# Patient Record
Sex: MALE | Race: WHITE | NOT HISPANIC OR LATINO | Employment: FULL TIME | URBAN - METROPOLITAN AREA
[De-identification: names, ages, dates, MRNs, and addresses within clinical notes are randomized per-mention and may not be internally consistent; named-entity substitution may affect disease eponyms.]

---

## 2020-02-06 ENCOUNTER — OFFICE VISIT - HEALTHEAST (OUTPATIENT)
Dept: INTERNAL MEDICINE | Facility: CLINIC | Age: 55
End: 2020-02-06

## 2020-02-06 DIAGNOSIS — Z12.5 SCREENING FOR PROSTATE CANCER: ICD-10-CM

## 2020-02-06 DIAGNOSIS — E78.5 HYPERLIPIDEMIA, UNSPECIFIED HYPERLIPIDEMIA TYPE: ICD-10-CM

## 2020-02-06 DIAGNOSIS — E11.9 CONTROLLED TYPE 2 DIABETES MELLITUS WITHOUT COMPLICATION, WITHOUT LONG-TERM CURRENT USE OF INSULIN (H): ICD-10-CM

## 2020-02-06 DIAGNOSIS — I10 ESSENTIAL HYPERTENSION: ICD-10-CM

## 2020-02-06 DIAGNOSIS — Z98.84 STATUS POST BARIATRIC SURGERY: ICD-10-CM

## 2020-02-06 ASSESSMENT — MIFFLIN-ST. JEOR: SCORE: 1850.5

## 2020-02-22 ENCOUNTER — OFFICE VISIT - HEALTHEAST (OUTPATIENT)
Dept: FAMILY MEDICINE | Facility: CLINIC | Age: 55
End: 2020-02-22

## 2020-02-22 DIAGNOSIS — S99.921A INJURY OF TOENAIL OF RIGHT FOOT, INITIAL ENCOUNTER: ICD-10-CM

## 2020-02-22 ASSESSMENT — MIFFLIN-ST. JEOR: SCORE: 2066.19

## 2020-03-25 ENCOUNTER — COMMUNICATION - HEALTHEAST (OUTPATIENT)
Dept: LAB | Facility: CLINIC | Age: 55
End: 2020-03-25

## 2020-08-07 ENCOUNTER — AMBULATORY - HEALTHEAST (OUTPATIENT)
Dept: LAB | Facility: CLINIC | Age: 55
End: 2020-08-07

## 2020-08-07 DIAGNOSIS — E11.9 CONTROLLED TYPE 2 DIABETES MELLITUS WITHOUT COMPLICATION, WITHOUT LONG-TERM CURRENT USE OF INSULIN (H): ICD-10-CM

## 2020-08-07 DIAGNOSIS — Z12.5 SCREENING FOR PROSTATE CANCER: ICD-10-CM

## 2020-08-07 DIAGNOSIS — I10 ESSENTIAL HYPERTENSION: ICD-10-CM

## 2020-08-07 DIAGNOSIS — E78.5 HYPERLIPIDEMIA, UNSPECIFIED HYPERLIPIDEMIA TYPE: ICD-10-CM

## 2020-08-07 DIAGNOSIS — Z98.84 STATUS POST BARIATRIC SURGERY: ICD-10-CM

## 2020-08-07 LAB
ALBUMIN SERPL-MCNC: 4.1 G/DL (ref 3.5–5)
ALP SERPL-CCNC: 59 U/L (ref 45–120)
ALT SERPL W P-5'-P-CCNC: 14 U/L (ref 0–45)
ANION GAP SERPL CALCULATED.3IONS-SCNC: 9 MMOL/L (ref 5–18)
AST SERPL W P-5'-P-CCNC: 17 U/L (ref 0–40)
BILIRUB SERPL-MCNC: 0.3 MG/DL (ref 0–1)
BUN SERPL-MCNC: 11 MG/DL (ref 8–22)
CALCIUM SERPL-MCNC: 9.6 MG/DL (ref 8.5–10.5)
CHLORIDE BLD-SCNC: 99 MMOL/L (ref 98–107)
CHOLEST SERPL-MCNC: 145 MG/DL
CO2 SERPL-SCNC: 29 MMOL/L (ref 22–31)
CREAT SERPL-MCNC: 1.14 MG/DL (ref 0.7–1.3)
CREAT UR-MCNC: 81.5 MG/DL
ERYTHROCYTE [DISTWIDTH] IN BLOOD BY AUTOMATED COUNT: 11.5 % (ref 11–14.5)
FASTING STATUS PATIENT QL REPORTED: YES
FERRITIN SERPL-MCNC: 25 NG/ML (ref 27–300)
GFR SERPL CREATININE-BSD FRML MDRD: >60 ML/MIN/1.73M2
GLUCOSE BLD-MCNC: 88 MG/DL (ref 70–125)
HBA1C MFR BLD: 6.9 %
HCT VFR BLD AUTO: 44.1 % (ref 40–54)
HDLC SERPL-MCNC: 37 MG/DL
HGB BLD-MCNC: 14.7 G/DL (ref 14–18)
LDLC SERPL CALC-MCNC: 79 MG/DL
MCH RBC QN AUTO: 30.8 PG (ref 27–34)
MCHC RBC AUTO-ENTMCNC: 33.4 G/DL (ref 32–36)
MCV RBC AUTO: 92 FL (ref 80–100)
MICROALBUMIN UR-MCNC: <0.5 MG/DL (ref 0–1.99)
MICROALBUMIN/CREAT UR: NORMAL MG/G{CREAT}
PLATELET # BLD AUTO: 175 THOU/UL (ref 140–440)
PMV BLD AUTO: 8.6 FL (ref 7–10)
POTASSIUM BLD-SCNC: 4.4 MMOL/L (ref 3.5–5)
PROT SERPL-MCNC: 7.1 G/DL (ref 6–8)
PSA SERPL-MCNC: 0.9 NG/ML (ref 0–3.5)
RBC # BLD AUTO: 4.78 MILL/UL (ref 4.4–6.2)
SODIUM SERPL-SCNC: 137 MMOL/L (ref 136–145)
TRIGL SERPL-MCNC: 143 MG/DL
TSH SERPL DL<=0.005 MIU/L-ACNC: 1.03 UIU/ML (ref 0.3–5)
VIT B12 SERPL-MCNC: 367 PG/ML (ref 213–816)
WBC: 7.8 THOU/UL (ref 4–11)

## 2020-08-10 LAB
25(OH)D3 SERPL-MCNC: 16.2 NG/ML (ref 30–80)
25(OH)D3 SERPL-MCNC: 16.2 NG/ML (ref 30–80)

## 2020-09-08 ENCOUNTER — OFFICE VISIT - HEALTHEAST (OUTPATIENT)
Dept: INTERNAL MEDICINE | Facility: CLINIC | Age: 55
End: 2020-09-08

## 2020-09-08 DIAGNOSIS — E78.5 HYPERLIPIDEMIA, UNSPECIFIED HYPERLIPIDEMIA TYPE: ICD-10-CM

## 2020-09-08 DIAGNOSIS — Z00.00 HEALTHCARE MAINTENANCE: ICD-10-CM

## 2020-09-08 DIAGNOSIS — E11.9 CONTROLLED TYPE 2 DIABETES MELLITUS WITHOUT COMPLICATION, WITHOUT LONG-TERM CURRENT USE OF INSULIN (H): ICD-10-CM

## 2020-09-08 DIAGNOSIS — E61.1 IRON DEFICIENCY: ICD-10-CM

## 2020-09-08 DIAGNOSIS — E55.9 VITAMIN D DEFICIENCY: ICD-10-CM

## 2020-09-08 DIAGNOSIS — Z12.11 SCREEN FOR COLON CANCER: ICD-10-CM

## 2020-09-08 DIAGNOSIS — I10 ESSENTIAL HYPERTENSION: ICD-10-CM

## 2020-09-08 ASSESSMENT — MIFFLIN-ST. JEOR: SCORE: 2143.07

## 2021-01-16 ENCOUNTER — RECORDS - HEALTHEAST (OUTPATIENT)
Dept: ADMINISTRATIVE | Facility: OTHER | Age: 56
End: 2021-01-16

## 2021-01-16 LAB — RETINOPATHY: NEGATIVE

## 2021-02-05 ENCOUNTER — RECORDS - HEALTHEAST (OUTPATIENT)
Dept: ADMINISTRATIVE | Facility: OTHER | Age: 56
End: 2021-02-05

## 2021-02-11 ENCOUNTER — RECORDS - HEALTHEAST (OUTPATIENT)
Dept: HEALTH INFORMATION MANAGEMENT | Facility: CLINIC | Age: 56
End: 2021-02-11

## 2021-02-18 ENCOUNTER — AMBULATORY - HEALTHEAST (OUTPATIENT)
Dept: LAB | Facility: CLINIC | Age: 56
End: 2021-02-18

## 2021-02-18 DIAGNOSIS — I10 ESSENTIAL HYPERTENSION: ICD-10-CM

## 2021-02-18 DIAGNOSIS — E55.9 VITAMIN D DEFICIENCY: ICD-10-CM

## 2021-02-18 DIAGNOSIS — E61.1 IRON DEFICIENCY: ICD-10-CM

## 2021-02-18 DIAGNOSIS — E11.9 CONTROLLED TYPE 2 DIABETES MELLITUS WITHOUT COMPLICATION, WITHOUT LONG-TERM CURRENT USE OF INSULIN (H): ICD-10-CM

## 2021-02-18 LAB
ALBUMIN SERPL-MCNC: 3.9 G/DL (ref 3.5–5)
ALP SERPL-CCNC: 56 U/L (ref 45–120)
ALT SERPL W P-5'-P-CCNC: 26 U/L (ref 0–45)
ANION GAP SERPL CALCULATED.3IONS-SCNC: 8 MMOL/L (ref 5–18)
AST SERPL W P-5'-P-CCNC: 25 U/L (ref 0–40)
BILIRUB SERPL-MCNC: 0.4 MG/DL (ref 0–1)
BUN SERPL-MCNC: 11 MG/DL (ref 8–22)
CALCIUM SERPL-MCNC: 9 MG/DL (ref 8.5–10.5)
CHLORIDE BLD-SCNC: 100 MMOL/L (ref 98–107)
CO2 SERPL-SCNC: 27 MMOL/L (ref 22–31)
CREAT SERPL-MCNC: 1.21 MG/DL (ref 0.7–1.3)
ERYTHROCYTE [DISTWIDTH] IN BLOOD BY AUTOMATED COUNT: 11.8 % (ref 11–14.5)
FERRITIN SERPL-MCNC: 60 NG/ML (ref 27–300)
GFR SERPL CREATININE-BSD FRML MDRD: >60 ML/MIN/1.73M2
GLUCOSE BLD-MCNC: 162 MG/DL (ref 70–125)
HBA1C MFR BLD: 7.5 %
HCT VFR BLD AUTO: 43.2 % (ref 40–54)
HGB BLD-MCNC: 14.4 G/DL (ref 14–18)
MCH RBC QN AUTO: 30.4 PG (ref 27–34)
MCHC RBC AUTO-ENTMCNC: 33.3 G/DL (ref 32–36)
MCV RBC AUTO: 91 FL (ref 80–100)
PLATELET # BLD AUTO: 254 THOU/UL (ref 140–440)
PMV BLD AUTO: 9.5 FL (ref 7–10)
POTASSIUM BLD-SCNC: 4.2 MMOL/L (ref 3.5–5)
PROT SERPL-MCNC: 7 G/DL (ref 6–8)
RBC # BLD AUTO: 4.73 MILL/UL (ref 4.4–6.2)
SODIUM SERPL-SCNC: 135 MMOL/L (ref 136–145)
WBC: 7.1 THOU/UL (ref 4–11)

## 2021-02-19 ENCOUNTER — OFFICE VISIT - HEALTHEAST (OUTPATIENT)
Dept: INTERNAL MEDICINE | Facility: CLINIC | Age: 56
End: 2021-02-19

## 2021-02-19 DIAGNOSIS — E55.9 VITAMIN D DEFICIENCY: ICD-10-CM

## 2021-02-19 DIAGNOSIS — E11.9 CONTROLLED TYPE 2 DIABETES MELLITUS WITHOUT COMPLICATION, WITHOUT LONG-TERM CURRENT USE OF INSULIN (H): ICD-10-CM

## 2021-02-19 DIAGNOSIS — F41.9 ANXIETY: ICD-10-CM

## 2021-02-19 DIAGNOSIS — Z12.11 SCREEN FOR COLON CANCER: ICD-10-CM

## 2021-02-19 DIAGNOSIS — G47.33 OBSTRUCTIVE SLEEP APNEA: ICD-10-CM

## 2021-02-19 LAB — 25(OH)D3 SERPL-MCNC: 25.1 NG/ML (ref 30–80)

## 2021-02-19 ASSESSMENT — MIFFLIN-ST. JEOR: SCORE: 2165.75

## 2021-03-07 ENCOUNTER — AMBULATORY - HEALTHEAST (OUTPATIENT)
Dept: MULTI SPECIALTY CLINIC | Facility: CLINIC | Age: 56
End: 2021-03-07

## 2021-03-12 ENCOUNTER — OFFICE VISIT - HEALTHEAST (OUTPATIENT)
Dept: INTERNAL MEDICINE | Facility: CLINIC | Age: 56
End: 2021-03-12

## 2021-03-12 DIAGNOSIS — F43.24 ADJUSTMENT DISORDER WITH DISTURBANCE OF CONDUCT: ICD-10-CM

## 2021-03-12 DIAGNOSIS — E11.9 CONTROLLED TYPE 2 DIABETES MELLITUS WITHOUT COMPLICATION, WITHOUT LONG-TERM CURRENT USE OF INSULIN (H): ICD-10-CM

## 2021-03-12 ASSESSMENT — ANXIETY QUESTIONNAIRES
GAD7 TOTAL SCORE: 0
7. FEELING AFRAID AS IF SOMETHING AWFUL MIGHT HAPPEN: NOT AT ALL
6. BECOMING EASILY ANNOYED OR IRRITABLE: NOT AT ALL
1. FEELING NERVOUS, ANXIOUS, OR ON EDGE: NOT AT ALL
5. BEING SO RESTLESS THAT IT IS HARD TO SIT STILL: NOT AT ALL
2. NOT BEING ABLE TO STOP OR CONTROL WORRYING: NOT AT ALL
IF YOU CHECKED OFF ANY PROBLEMS ON THIS QUESTIONNAIRE, HOW DIFFICULT HAVE THESE PROBLEMS MADE IT FOR YOU TO DO YOUR WORK, TAKE CARE OF THINGS AT HOME, OR GET ALONG WITH OTHER PEOPLE: NOT DIFFICULT AT ALL
3. WORRYING TOO MUCH ABOUT DIFFERENT THINGS: NOT AT ALL
4. TROUBLE RELAXING: NOT AT ALL

## 2021-03-15 ENCOUNTER — COMMUNICATION - HEALTHEAST (OUTPATIENT)
Dept: ADMINISTRATIVE | Facility: CLINIC | Age: 56
End: 2021-03-15

## 2021-03-26 ENCOUNTER — AMBULATORY - HEALTHEAST (OUTPATIENT)
Dept: NURSING | Facility: CLINIC | Age: 56
End: 2021-03-26

## 2021-04-01 ENCOUNTER — COMMUNICATION - HEALTHEAST (OUTPATIENT)
Dept: FAMILY MEDICINE | Facility: CLINIC | Age: 56
End: 2021-04-01

## 2021-04-01 DIAGNOSIS — I10 ESSENTIAL HYPERTENSION: ICD-10-CM

## 2021-04-01 DIAGNOSIS — E78.5 HYPERLIPIDEMIA, UNSPECIFIED HYPERLIPIDEMIA TYPE: ICD-10-CM

## 2021-04-15 VITALS — HEIGHT: 70 IN | BODY MASS INDEX: 40.09 KG/M2 | WEIGHT: 280 LBS

## 2021-04-15 RX ORDER — ESCITALOPRAM OXALATE 10 MG/1
10 TABLET ORAL
COMMUNITY
Start: 2021-02-19 | End: 2022-03-10

## 2021-04-15 RX ORDER — LISINOPRIL 20 MG/1
20 TABLET ORAL
COMMUNITY
Start: 2019-10-14 | End: 2022-05-15

## 2021-04-15 RX ORDER — SIMVASTATIN 40 MG
40 TABLET ORAL
COMMUNITY
Start: 2019-10-14 | End: 2022-05-15

## 2021-04-15 ASSESSMENT — MIFFLIN-ST. JEOR: SCORE: 2111.32

## 2021-04-15 NOTE — PATIENT INSTRUCTIONS
For general sleep health questions:   http://sleepeducation.org    For tips about PAP and COVID-19:  https://www.thoracic.org/patients/patient-resources/resources/covid-19-and-home-pap-therapy.pdf    For general info about COVID-19 including vaccines:  https://[employer].org/covid19        Continue PAP therapy every night, for all hours that you are sleeping (including naps.)  As always, try to get at least 8 hours of sleep or more each day, keep a regular sleep schedule, and avoid sleep deprivation. Avoid alcohol.    Reasons that you might need a change to your pressure therapy would be weight gain or loss, waking having inadvertently removed your PAP overnight, having previously felt refreshed by sleep with CPAP use and now waking un-refreshed, and return of daytime sleepiness. Also, the development of new medical problems  (such as heart failure, stroke, medications such as narcotics) can sometimes affect breathing at night and change your PAP therapy needs.    Please bring PAP with you if you are hospitalized.  If anticipating surgery be sure to discuss with your surgeon that you have sleep apnea and use PAP therapy.      Maintain your equipment as recommended which includes routine cleaning and replacement of supplies.      Call DME for any questions regarding supplies or maintenance.    You DME supplier is [facility]      Do not drive on engage in potentially dangerous activities if feeling sleepy.    Please follow up in sleep clinic again in 2 months.        Tips for your PAP use-    Mask fitting tips  Mask fitting exercise:    To improve your mask seal and your mobility at night, put mask on and secure in place.  Lie down in bed with full pressure and roll to one side, adjust headgear while in that position to eliminate any leaks. Repeat process rolling to other side.     The mask seal does not have to be perfect:   CPAP machines are designed to make up for small leaks. However, you will not  tolerate leaks blowing in your eyes so you will need to adjust.   Any leak should only be near or at the bottom of the mask.  We expect your mask to leak slightly at night.    Do not over-tighten the headgear straps, tighter IS NOT better, we expect minimal leak.    First try re-positioning the mask or headgear before tightening the headgear straps.  Mask leaks are expected due to changing sleeping positions. Try pulling the mask away from your skin allowing the cushion to re-inflate will minimize the leak.  If you struggle for a good fit, try turning the CPAP off and then readjust the mask by pulling it away from your face and then turning back on the CPAP.        Humidifier tips  Humidifiers can be adjusted to increase or decrease the amount of moisture according to your comfort level. You may need to adjust this frequently at first, but then might only change it with seasonal weather changes.     Try INCREASING the humidity if:  You experience a dry, irritated nasal passage or throat.  You have a runny, drippy nose or sneezing fits after using CPAP.  You experience nasal congestion during or after CPAP use.    Try DECREASING the humidity if:  You have excessive condensation or  rain out  in the tubing or mask.  Otherwise keep the tubing warm during the night by running it underneath the blankets or pillow.      Clinic visit after initial PAP set-up   Bring your equipment with you to your 5-8 week follow up clinic visit.  We will be extracting your data from the machine if not available from the cloud based modem.        Travel  Always take your equipment with you when you travel.  If you fly with your equipment bring it on with you as a carry on.  Medical equipment does not count as a carry on.    If you travel international the machines take 110-240v.  The only adapter needed is the adapter that will fit into the receptacle (outlet).    You may also want to bring an extension cord as many hotel rooms have  limited outlets at the bedside.  Do not travel with water in your humidifier chamber.     Cleaning and Maintenance Guidelines    Equipment Frequency Cleaning Method   Mask First Day    Daily      Weekly Soak mask in hot soapy water for 30 minutes, rinse and air dry.  Wipe nasal cushion with a hot soapy (Ivory, baby shampoo) cloth and rinse.  Baby wipes may also be used.  Do not use anti-bacterial soaps,Johnna  liquid soap, rubbing alcohol, bleach or ammonia.  Wash frame in hot soapy water (Ivory, baby shampoo) rinse and let air dry   Headgear Biweekly Wash in hot soapy water, rinse and air dry   Reusable Gray Filter Weekly Wash in hot soapy water, rinse, put in towel squeeze moisture out, let air dry   Disposable White Filter Check Weekly Replace when brown or gray in color; at least every 2 to 3 months   Humidifier Chamber Daily    Weekly Empty distilled water from humidifier and let air dry    Hand wash in hot soapy water, rinse and air dry   Tubing Weekly Wash in hot soapy water, rinse and let air dry   Mask, Tubing and Humidifier Chamber As needed Disinfect: Soak in 1 part distilled white vinegar to 3 parts hot water for 30 minutes, rinse well and air dry  Not the material headgear        MASK AND SUPPLY REORDERING and EQUIPMENT NEEDS through your DME and per your insurance  Reminder: Most insurance companies will allow for a new mask, headgear, tubing, and reusable gray filter every six months.  Disposable white ultra-fine filters are covered monthly.      HOME AND SAFETY INSTRUCTIONS    Do not use frayed or cracked electrical cords, multi plug adaptors, or switched receptacles    Do not immerse electrical equipment into water    Assure that electrical cords do not become a tripping hazard    Your BMI is Body mass index is 40.18 kg/m .  Weight management is a personal decision.  If you are interested in exploring weight loss strategies, the following discussion covers the approaches that may be successful. Body  mass index (BMI) is one way to tell whether you are at a healthy weight, overweight, or obese. It measures your weight in relation to your height.  A BMI of 18.5 to 24.9 is in the healthy range. A person with a BMI of 25 to 29.9 is considered overweight, and someone with a BMI of 30 or greater is considered obese. More than two-thirds of American adults are considered overweight or obese.  Being overweight or obese increases the risk for further weight gain. Excess weight may lead to heart disease and diabetes.  Creating and following plans for healthy eating and physical activity may help you improve your health.  Weight control is part of healthy lifestyle and includes exercise, emotional health, and healthy eating habits. Careful eating habits lifelong are the mainstay of weight control. Though there are significant health benefits from weight loss, long-term weight loss with diet alone may be very difficult to achieve- studies show long-term success with dietary management in less than 10% of people. Attaining a healthy weight may be especially difficult to achieve in those with severe obesity. In some cases, medications, devices and surgical management might be considered.  What can you do?  If you are overweight or obese and are interested in methods for weight loss, you should discuss this with your provider.     Consider reducing daily calorie intake by 500 calories.     Keep a food journal.     Avoiding skipping meals, consider cutting portions instead.    Diet combined with exercise helps maintain muscle while optimizing fat loss. Strength training is particularly important for building and maintaining muscle mass. Exercise helps reduce stress, increase energy, and improves fitness. Increasing exercise without diet control, however, may not burn enough calories to loose weight.       Start walking three days a week 10-20 minutes at a time    Work towards walking thirty minutes five days a week      Eventually, increase the speed of your walking for 1-2 minutes at time    In addition, we recommend that you review healthy lifestyles and methods for weight loss available through the National Institutes of Health patient information sites:  http://win.niddk.nih.gov/publications/index.htm    And look into health and wellness programs that may be available through your health insurance provider, employer, local community center, or xochilt club.    Weight management plan: Patient was referred to their PCP to discuss a diet and exercise plan.

## 2021-04-15 NOTE — PROGRESS NOTES
Enrike is a 55 year old who is being evaluated via a billable video visit.      How would you like to obtain your AVS? Mail a copy  If the video visit is dropped, the invitation should be resent by: Text to cell phone: [phone]  Will anyone else be joining your video visit? No    Fifi Garcia CMA      Video-Visit Details   Changed to telephone:   Type of service:  Video Visit    Tel Start time: 2:11 PM  Tel End Time:2:44 PM          Chief complaint: Having trouble keeping CPAP on all night    History of Present Illness: 55-year-old gentleman with history of diabetes, hypertension, obesity, obstructive sleep apnea.  He works as a  and has an irregular schedule.  He admits to difficulty with keeping CPAP on all night.  He is tends to wake up at night every couple of hours.  He is not sure what is waking him up.  He is worried that it is low oxygen levels.  It is hard for him to get 4 hours of CPAP use in one night.  He has not identified any specific issues such as nasal congestion or nocturnal reflux.  He denies waking up with a sense of gasping choking or inadequate pressure.  His sleep is not witnessed.  He usually sleeps on his back.  He uses a nasal pillows style mask.  He states is up-to-date and is recently been changed.  He denies any problems with his machine.  He uses the humidifier.  He has gained some weight since his last sleep study.  He does wake up to go to the bathroom at night.  Denies restless legs.  No known sleepwalking sleep talking or dream enactment behavior.  During the day he denies excessive sleepiness.  Sometimes he can get a little sleepy if he is sitting and not doing anything active.  He denies any symptoms of sleepiness behind the wheel.  Usually drinks 0-1 caffeinated beverage a day.  He does not drink alcohol.    [place] Sleepiness Scale   Sitting and reading: Would never doze   Watching TV: Moderate chance of dozing   Sitting, inactive in a public place (e.g. a  theatre or a meeting): Moderate chance of dozing   As a passenger in a car for an hour without a break: Moderate chance of dozing   Lying down to rest in the afternoon when circumstances permit: Would never doze   Sitting and talking to someone: Would never doze   Sitting quietly after a lunch without alcohol: Would never doze   In a car, while stopped for a few minutes in traffic: Would never doze   Total score - [employer]: 6   (Less than 10 normal)    Insomnia Severity Scale  ERENDIRA Total Score: 14  Total score categories:  0-7 = No clinically significant insomnia   8-14 = Subthreshold insomnia   15-21 = Clinical insomnia (moderate severity)  22-28 = Clinical insomnia (severe)        Past Medical History:   Diagnosis Date     Benign essential hypertension      Class 3 severe obesity due to excess calories with serious comorbidity and body mass index (BMI) of 40.0 to 44.9 in adult (H)      Diabetes mellitus (H)      JAH (obstructive sleep apnea)        No Known Allergies    Current Outpatient Medications   Medication     cholecalciferol 50 MCG (2000 UT) tablet     escitalopram (LEXAPRO) 10 MG tablet     lisinopril (ZESTRIL) 20 MG tablet     metFORMIN (GLUCOPHAGE) 1000 MG tablet     simvastatin (ZOCOR) 40 MG tablet     aspirin (ASA) 81 MG EC tablet     No current facility-administered medications for this visit.        Social History     Socioeconomic History     Marital status:      Spouse name: Not on file     Number of children: Not on file     Years of education: Not on file     Highest education level: Not on file   Occupational History     Not on file   Social Needs     Financial resource strain: Not on file     Food insecurity     Worry: Not on file     Inability: Not on file     Transportation needs     Medical: Not on file     Non-medical: Not on file   Tobacco Use     Smoking status: Never Smoker     Smokeless tobacco: Never Used   Substance and Sexual Activity     Alcohol use: Not on file     Drug use:  "Not on file     Sexual activity: Not on file   Lifestyle     Physical activity     Days per week: Not on file     Minutes per session: Not on file     Stress: Not on file   Relationships     Social connections     Talks on phone: Not on file     Gets together: Not on file     Attends Bahai service: Not on file     Active member of club or organization: Not on file     Attends meetings of clubs or organizations: Not on file     Relationship status: Not on file     Intimate partner violence     Fear of current or ex partner: Not on file     Emotionally abused: Not on file     Physically abused: Not on file     Forced sexual activity: Not on file   Other Topics Concern     Parent/sibling w/ CABG, MI or angioplasty before 65F 55M? Not Asked   Social History Narrative     Not on file       No family history on file.    Review of Systems: Positve per HPI, otherwise comprehensive review of systems is negative.    EXAM:  Ht 1.778 m (5' 10\")   Wt 127 kg (280 lb)   BMI 40.18 kg/m    GENERAL: Alert and no distress  RESP: No audible wheeze, cough; able to speak in complete sentences  PSYCH: Mentation appears normal, affect normalt, judgement and insight intact, normal speech..       PSG split-night [facility] 6/27/2017  Weight 265 lbs, BMI 38  AHI 41, RDI 47, Lowest sat O2 76%  CPAP titrated to 8-9    [facility]  PAP download from 2/15/2021 to 4/15/2021 reviewed:  Per cent of days used greater than 4 Hours 41% (minimum goal greater than 70%)  Average use on days used: 4 hours 41 min  Settings:CPAP 9 cmH2O  Average AHI 6.0 events per hour (goal less than 5)  Leak a 55-year-old gentleman with history of obesity, cceptable    ASSESSMENT:  Severe sleep apnea, hypertension and diabetes.  He is having unintentional mask removal affecting compliance.  AHI is suboptimally treated.  It is possible that pressures are inadequate and causing on intentional mask removal. Likely also behavioral factors including " irregular sleep work schedule that are likely impacting compliance.    PLAN:  Orders generated to put his device into auto titrating mode with min pressure of 9 max of 15.  Patient is instructed on the importance of using CPAP all night every night regardless of his schedule.  He should follow-up with me in 6 to 8 weeks with repeat download to reassess for benefit.  He is agreeable with this plan.  He is encouraged to continue efforts at weight management and follow-up with primary care regarding options.    58 minutes spent on the date of the encounter doing chart review, history and exam, documentation and further activities per the note    Mervat Martin M.D.  Pulmonary/Critical Care/Sleep Medicine    [facility]   Floor 1, [address]   [address] [place], [place] [address]. [place] [address]   Appointments: [phone]    The above note was dictated using voice recognition software and may include typographical errors. Please contact the author for any clarifications.

## 2021-04-16 ENCOUNTER — VIRTUAL VISIT (OUTPATIENT)
Dept: SLEEP MEDICINE | Facility: CLINIC | Age: 56
End: 2021-04-16
Payer: COMMERCIAL

## 2021-04-16 ENCOUNTER — AMBULATORY - HEALTHEAST (OUTPATIENT)
Dept: NURSING | Facility: CLINIC | Age: 56
End: 2021-04-16

## 2021-04-16 DIAGNOSIS — E66.01 MORBID OBESITY (H): ICD-10-CM

## 2021-04-16 DIAGNOSIS — G47.33 OSA (OBSTRUCTIVE SLEEP APNEA): Primary | ICD-10-CM

## 2021-04-16 PROCEDURE — 99204 OFFICE O/P NEW MOD 45 MIN: CPT | Mod: 95 | Performed by: INTERNAL MEDICINE

## 2021-04-16 SDOH — HEALTH STABILITY: MENTAL HEALTH: HOW OFTEN DO YOU HAVE A DRINK CONTAINING ALCOHOL?: NOT ASKED

## 2021-04-16 SDOH — HEALTH STABILITY: MENTAL HEALTH: HOW OFTEN DO YOU HAVE 6 OR MORE DRINKS ON ONE OCCASION?: NOT ASKED

## 2021-04-16 SDOH — HEALTH STABILITY: MENTAL HEALTH: HOW MANY STANDARD DRINKS CONTAINING ALCOHOL DO YOU HAVE ON A TYPICAL DAY?: NOT ASKED

## 2021-04-19 ENCOUNTER — COMMUNICATION - HEALTHEAST (OUTPATIENT)
Dept: INTERNAL MEDICINE | Facility: CLINIC | Age: 56
End: 2021-04-19

## 2021-04-19 NOTE — NURSING NOTE
Orders sent to [facility] for pressure change, JOEL mailed to get records from [facility], AVS mailed and patient scheduled for follow up.    Claudia Gresham [facility] ~[place]

## 2021-05-02 ENCOUNTER — HEALTH MAINTENANCE LETTER (OUTPATIENT)
Age: 56
End: 2021-05-02

## 2021-05-15 ENCOUNTER — AMBULATORY - HEALTHEAST (OUTPATIENT)
Dept: LAB | Facility: HOSPITAL | Age: 56
End: 2021-05-15

## 2021-05-15 DIAGNOSIS — E11.9 CONTROLLED TYPE 2 DIABETES MELLITUS WITHOUT COMPLICATION, WITHOUT LONG-TERM CURRENT USE OF INSULIN (H): ICD-10-CM

## 2021-05-15 LAB
ANION GAP SERPL CALCULATED.3IONS-SCNC: 5 MMOL/L (ref 5–18)
BUN SERPL-MCNC: 15 MG/DL (ref 8–22)
CALCIUM SERPL-MCNC: 9.4 MG/DL (ref 8.5–10.5)
CHLORIDE BLD-SCNC: 100 MMOL/L (ref 98–107)
CO2 SERPL-SCNC: 29 MMOL/L (ref 22–31)
CREAT SERPL-MCNC: 1.21 MG/DL (ref 0.7–1.3)
GFR SERPL CREATININE-BSD FRML MDRD: >60 ML/MIN/1.73M2
GLUCOSE BLD-MCNC: 221 MG/DL (ref 70–125)
HBA1C MFR BLD: 7.5 %
POTASSIUM BLD-SCNC: 4.4 MMOL/L (ref 3.5–5)
SODIUM SERPL-SCNC: 134 MMOL/L (ref 136–145)

## 2021-05-17 LAB — 25(OH)D3 SERPL-MCNC: 21.4 NG/ML (ref 30–80)

## 2021-05-21 ENCOUNTER — OFFICE VISIT - HEALTHEAST (OUTPATIENT)
Dept: FAMILY MEDICINE | Facility: CLINIC | Age: 56
End: 2021-05-21

## 2021-05-21 DIAGNOSIS — E11.9 CONTROLLED TYPE 2 DIABETES MELLITUS WITHOUT COMPLICATION, WITHOUT LONG-TERM CURRENT USE OF INSULIN (H): ICD-10-CM

## 2021-05-21 ASSESSMENT — MIFFLIN-ST. JEOR: SCORE: 2148.4

## 2021-05-26 ENCOUNTER — RECORDS - HEALTHEAST (OUTPATIENT)
Dept: ADMINISTRATIVE | Facility: CLINIC | Age: 56
End: 2021-05-26

## 2021-05-28 ASSESSMENT — ANXIETY QUESTIONNAIRES: GAD7 TOTAL SCORE: 0

## 2021-05-29 ENCOUNTER — RECORDS - HEALTHEAST (OUTPATIENT)
Dept: ADMINISTRATIVE | Facility: CLINIC | Age: 56
End: 2021-05-29

## 2021-06-04 VITALS
BODY MASS INDEX: 32.1 KG/M2 | HEART RATE: 62 BPM | DIASTOLIC BLOOD PRESSURE: 80 MMHG | WEIGHT: 224.25 LBS | OXYGEN SATURATION: 96 % | HEIGHT: 70 IN | SYSTOLIC BLOOD PRESSURE: 148 MMHG

## 2021-06-04 VITALS
WEIGHT: 271.8 LBS | BODY MASS INDEX: 38.91 KG/M2 | RESPIRATION RATE: 18 BRPM | SYSTOLIC BLOOD PRESSURE: 130 MMHG | HEART RATE: 74 BPM | DIASTOLIC BLOOD PRESSURE: 75 MMHG | HEIGHT: 70 IN | OXYGEN SATURATION: 98 %

## 2021-06-04 VITALS
HEART RATE: 70 BPM | BODY MASS INDEX: 41.09 KG/M2 | HEIGHT: 70 IN | WEIGHT: 287 LBS | DIASTOLIC BLOOD PRESSURE: 62 MMHG | SYSTOLIC BLOOD PRESSURE: 102 MMHG | OXYGEN SATURATION: 97 %

## 2021-06-05 VITALS
HEIGHT: 70 IN | BODY MASS INDEX: 41.8 KG/M2 | TEMPERATURE: 97.7 F | OXYGEN SATURATION: 98 % | WEIGHT: 292 LBS | SYSTOLIC BLOOD PRESSURE: 110 MMHG | HEART RATE: 77 BPM | DIASTOLIC BLOOD PRESSURE: 60 MMHG

## 2021-06-05 NOTE — PROGRESS NOTES
Office Visit   Enrike Daniel   54 y.o. male    Date of Visit: 2/6/2020    Chief Complaint   Patient presents with     Establish Care     Has not taken any of his meds since September 2019         Assessment and Plan   1. Controlled type 2 diabetes mellitus without complication, without long-term current use of insulin (H)  We will get him back on the metformin.  He has not been checking his blood sugars.  I will plan to recheck labs in about 6 to 8 weeks.  We will have him come back after that to reassess with a physical exam.  He is in agreement with this plan.  - metFORMIN (GLUCOPHAGE) 500 MG tablet; Take 1 tablet (500 mg total) by mouth 2 (two) times a day with meals.  Dispense: 180 tablet; Refill: 3  - Glycosylated Hemoglobin A1c; Future  - Microalbumin, Random Urine; Future    2. Essential hypertension  Blood pressure is little bit high today but he has not been taking medication.  I have renewed the lisinopril and he will restart that today.  Again we will recheck his labs in about 6 to 8 weeks.  - lisinopril (PRINIVIL,ZESTRIL) 20 MG tablet; Take 1 tablet (20 mg total) by mouth daily.  Dispense: 90 tablet; Refill: 3  - Thyroid Cascade; Future  - Comprehensive Metabolic Panel; Future    3. Hyperlipidemia, unspecified hyperlipidemia type  We will restart the simvastatin and recheck in a couple months.  - simvastatin (ZOCOR) 40 MG tablet; Take 1 tablet (40 mg total) by mouth at bedtime.  Dispense: 90 tablet; Refill: 3  - Lipid Cascade; Future    4. Status post bariatric surgery  He has a history of bariatric surgery.  With his lab draw coming up I will check his CBC, and vitamin levels.  - HM2(CBC w/o Differential); Future  - Vitamin D, Total (25-Hydroxy); Future  - Vitamin B12; Future  - Ferritin; Future    5. Screening for prostate cancer  - PSA (Prostatic-Specific Antigen), Annual Screen; Future         Return in about 9 weeks (around 4/9/2020) for Annual physical.     History of Present Illness   This 54  "y.o. old male comes in to establish care.  He has a history of type 2 diabetes mellitus, hypertension and hyperlipidemia.  He has not been on medication now for about 6 months.  States he ran out of medicine and did not have insurance.  He has not had any symptoms and has been feeling well.  He knows that he does need to get back on medication.  He is interested in resuming that today.  He also notes that he has a history of bariatric surgery.  He has not been taking any vitamins.  He has no acute concerns today.    Review of Systems: As above, systems otherwise reviewed and negative.     Medications, Allergies and Problem List   Patient Active Problem List   Diagnosis     Adjustment disorder with disturbance of conduct     Controlled type 2 diabetes mellitus without complication, without long-term current use of insulin (H)     Esophageal reflux     Essential hypertension     Hyperlipidemia     Obstructive sleep apnea     Status post bariatric surgery     Current Outpatient Medications   Medication Sig Dispense Refill     aspirin 81 MG EC tablet Take 81 mg by mouth daily.       lisinopril (PRINIVIL,ZESTRIL) 20 MG tablet Take 1 tablet (20 mg total) by mouth daily. 90 tablet 3     metFORMIN (GLUCOPHAGE) 500 MG tablet Take 1 tablet (500 mg total) by mouth 2 (two) times a day with meals. 180 tablet 3     simvastatin (ZOCOR) 40 MG tablet Take 1 tablet (40 mg total) by mouth at bedtime. 90 tablet 3     No current facility-administered medications for this visit.      No Known Allergies       Physical Exam     /80 (Patient Site: Left Arm, Patient Position: Sitting, Cuff Size: Adult Regular)   Pulse 62   Ht 5' 9.5\" (1.765 m)   Wt (!) 224 lb 4 oz (101.7 kg)   SpO2 96%   BMI 32.64 kg/m      General: This is an alert male, sitting comfortably, no apparent distress.     Additional Information   Social History     Tobacco Use     Smoking status: Never Smoker   Substance Use Topics     Alcohol use: Not Currently     " Drug use: Not on file            Carine Hancock MD

## 2021-06-06 NOTE — PATIENT INSTRUCTIONS - HE
1. File down excess calloused skin.   2. Keep nailbed covered to protect it from snagging.   3. Follow up with podiatry within the next few weeks.

## 2021-06-06 NOTE — PROGRESS NOTES
"Chief Complaint   Patient presents with     Toe Injury     R/T big toe--- happened last night 2/21. Stubbed r/t big toe.        HPI:  Enrike Daniel is a 54 y.o. male with past medical history of controlled type 2 diabetes without complication who presents today complaining of injury to the right great toenail.  Patient states that he stubbed his right great toe last night and part of his nail broke horizontally. Patient denies any to pain.     History obtained from the patient.    Problem List:  2011-10: Adjustment disorder with disturbance of conduct  2011-09: Status post bariatric surgery  2011-03: Esophageal reflux  2008-04: Controlled type 2 diabetes mellitus without complication,   without long-term current use of insulin (H)  2006-04: Obstructive sleep apnea  2006-04: Hyperlipidemia  2006-04: Essential hypertension      No past medical history on file.    Social History     Tobacco Use     Smoking status: Never Smoker     Smokeless tobacco: Never Used   Substance Use Topics     Alcohol use: Not Currently       Review of Systems   Skin:        (+) right great toe nail injury.    All other systems reviewed and are negative.      Vitals:    02/22/20 0946   BP: 130/75   Patient Site: Left Arm   Patient Position: Sitting   Cuff Size: Adult Large   Pulse: 74   Resp: 18   SpO2: 98%   Weight: (!) 271 lb 12.8 oz (123.3 kg)   Height: 5' 9.5\" (1.765 m)       Physical Exam  Constitutional:       General: He is not in acute distress.     Appearance: He is well-developed. He is not diaphoretic.   HENT:      Head: Normocephalic and atraumatic.      Right Ear: External ear normal.      Left Ear: External ear normal.   Eyes:      General:         Right eye: No discharge.         Left eye: No discharge.      Conjunctiva/sclera: Conjunctivae normal.   Pulmonary:      Effort: Pulmonary effort is normal. No respiratory distress.   Feet:      Right foot:      Toenail Condition: Right toenails are abnormally thick and long.      " Left foot:      Toenail Condition: Left toenails are abnormally thick and long. Fungal disease present.     Comments: Thickened and yellow nail. Right great nail has broken 80% horizontally. It's hanging. No bleeding present. No pain with manipulation of the nail.   Psychiatric:         Behavior: Behavior normal.         Thought Content: Thought content normal.         Judgment: Judgment normal.         Clinical Decision Making:  No numbing necessary. The nail was cut the rest of the way to avoid snagging and tearing of the nail. Suspect fungal infection due to the thickness and discoloration. Nail bed is intact. Recommend follow up with podiatry as this patient does have DM2. No current concerns for fracture or cellulitis.   At the end of the encounter, I discussed results, diagnosis, medications. Discussed red flags for immediate return to clinic/ER, as well as indications for follow up if no improvement. Patient understood and agreed to plan. Patient was stable for discharge.    1. Injury of toenail of right foot, initial encounter  Ambulatory referral to Podiatry         Patient Instructions   1. File down excess calloused skin.   2. Keep nailbed covered to protect it from snagging.   3. Follow up with podiatry within the next few weeks.

## 2021-06-11 NOTE — PROGRESS NOTES
Office Visit   Enrike Daniel   55 y.o. male    Date of Visit: 9/8/2020    Chief Complaint   Patient presents with     Annual Exam     Pt is fasting        Assessment and Plan   1. Controlled type 2 diabetes mellitus without complication, without long-term current use of insulin (H)  He recently had an A1c that was at 6.9.  We will continue to monitor.  His blood pressure is well controlled and his lipids were satisfactory.  Foot exam today is normal.  - Glycosylated Hemoglobin A1c; Future    2. Essential hypertension  - HM2(CBC w/o Differential); Future  - Comprehensive Metabolic Panel; Future    3. Hyperlipidemia, unspecified hyperlipidemia type  He is on simvastatin and has excellent lipids.  He has not had any cardiac symptoms.      4. Iron deficiency  Recent labs did show a low ferritin.  Could be due to his history of bariatric surgery however I am going to set him up for an endoscopy and colonoscopy.  Recommend that he start taking an iron supplement and will recheck in 5 months.  - Ambulatory referral for Upper GI Endoscopy  - Ferritin; Future    5. Vitamin D deficiency  He has resumed taking daily vitamin D supplementation.  - Vitamin D, Total (25-Hydroxy); Future    6. Screen for colon cancer  - Ambulatory referral for Colonoscopy    7. Healthcare maintenance  He is due for a flu shot today and colon cancer screening.  Otherwise age-appropriate health maintenance is up-to-date.         Return in about 5 months (around 2/8/2021) for Recheck.     History of Present Illness   This 55 y.o. old male comes in for a general exam.  He has type 2 diabetes mellitus and recent A1c was at 6.9.  He is tried to make some diet changes since then.  His blood pressure is well controlled and he is on a statin medication.  Recent LDL was satisfactory.  He has not had any recent symptoms of chest pain or palpitations or other cardiac symptoms.  He has no acute concerns in this regard.  We did recently find that his ferritin  "level is a little bit low.  He is due for colon cancer screening and we will also do an endoscopy.  He has a history of bariatric surgery which could be causing his anemia.    Review of Systems: As above, systems otherwise reviewed and negative.     Medications, Allergies and Problem List   Patient Active Problem List   Diagnosis     Adjustment disorder with disturbance of conduct     Controlled type 2 diabetes mellitus without complication, without long-term current use of insulin (H)     Esophageal reflux     Essential hypertension     Hyperlipidemia     Obstructive sleep apnea     Status post bariatric surgery     Current Outpatient Medications   Medication Sig Dispense Refill     aspirin 81 MG EC tablet Take 81 mg by mouth daily.       cholecalciferol, vitamin D3, 5,000 unit Tab Take by mouth.       lisinopril (PRINIVIL,ZESTRIL) 20 MG tablet Take 1 tablet (20 mg total) by mouth daily. 90 tablet 3     metFORMIN (GLUCOPHAGE) 500 MG tablet Take 1 tablet (500 mg total) by mouth 2 (two) times a day with meals. 180 tablet 3     simvastatin (ZOCOR) 40 MG tablet Take 1 tablet (40 mg total) by mouth at bedtime. 90 tablet 3     No current facility-administered medications for this visit.      No Known Allergies       Physical Exam     /62 (Patient Site: Right Arm, Patient Position: Sitting)   Pulse 70   Ht 5' 10\" (1.778 m)   Wt (!) 287 lb (130.2 kg)   SpO2 97%   BMI 41.18 kg/m      General:  Patient is alert and in no apparent distress.  Neck:  Supple with no adenopathy or thyroid abnormality noted.  Cardiovascular:  Regular rate and rhythm, normal S1/S2, no murmurs, rubs, or gallop.  Pulmonary:  Lungs are clear to auscultation bilaterally with normal respiratory effort.  Gastrointestinal:  Abdomen is soft, non-tender, non-distended, with no organomegaly, rebound or guarding.  Extremities:  No joint abnormalities with no LE edema.  Strong dorsalis pedis pulses.  Foot exam does not show any ulcerations or " abnormalities.  Neurologic Cranial nerves are intact.  No focal deficits.  Psychiatric:  Pleasant, no confusion or agitation   Skin:  Warm and well perfused with no rashes or abnormalities.         Additional Information   Social History     Tobacco Use     Smoking status: Never Smoker     Smokeless tobacco: Never Used   Substance Use Topics     Alcohol use: Not Currently     Drug use: Not on file            Carine Hancock MD

## 2021-06-15 NOTE — PROGRESS NOTES
Office Visit   Enrike Daniel   55 y.o. male    Date of Visit: 2/19/2021    Chief Complaint   Patient presents with     Follow-up     diabetes        Assessment and Plan   1. Controlled type 2 diabetes mellitus without complication, without long-term current use of insulin (H)  His A1c has gone up a little bit to 7.5.  I am going to increase his metformin to 1000 mg twice daily.  We will need to recheck in about 3 months.  He is in agreement with this plan.  - metFORMIN (GLUCOPHAGE) 1000 MG tablet; Take 1 tablet (1,000 mg total) by mouth 2 (two) times a day with meals.  Dispense: 180 tablet; Refill: 3    2. Obstructive sleep apnea  He has not been sleeping as well and wonders if he needs a recheck of his sleep apnea.  Will refer him to the sleep clinic.  - Ambulatory referral to Sleep Medicine    3. Anxiety  He is having significant anxiety.  He has had 2 family members die of COVID-19 and his mom is in the hospital.  He is interested in taking medicine for anxiety.  I am going to start Lexapro 10 mg daily.  Discussed potential side effects.  We will do a virtual visit in 2 to 3 weeks to reassess.  - escitalopram oxalate (LEXAPRO) 10 MG tablet; Take 1 tablet (10 mg total) by mouth daily.  Dispense: 90 tablet; Refill: 3    4. Vitamin D deficiency  He stopped taking the vitamin D and his levels a little bit low.  Encouraged him to stay on vitamin D long-term.  - cholecalciferol, vitamin D3, (VITAMIN D3) 50 mcg (2,000 unit) Tab; Take 0.5 tablets (1,000 Units total) by mouth daily.  Dispense: 100 tablet; Refill: 0    5. [facility]  [facility]         Return in about 3 weeks (around 3/12/2021) for using a video visit.     History of Present Illness   This 55 y.o. old male comes in to follow-up.  He has a history of type 2 diabetes mellitus.  He has had a lot of situational stressors recently and his sugars have been running a little bit higher.  He had an A1c done yesterday that is at 7.5.  He is on  "low-dose Metformin and could increase the dose.  Blood pressure is well controlled.  He is not having any cardiac symptoms.  He does note that he is having a lot of trouble with sleep.  He has sleep apnea and is on a CPAP machine.  He is waking up a lot more.  He is interested in getting rechecked to see if he needs his settings adjusted.  He also notes significant anxiety which is bothering him.  He is interested in trying medication to see if that is helpful.  Has no other acute concerns.    Review of Systems: As above, systems otherwise reviewed and negative.     Medications, Allergies and Problem List   Patient Active Problem List   Diagnosis     Adjustment disorder with disturbance of conduct     Controlled type 2 diabetes mellitus without complication, without long-term current use of insulin (H)     Esophageal reflux     Essential hypertension     Hyperlipidemia     Obstructive sleep apnea     Status post bariatric surgery     Current Outpatient Medications   Medication Sig Dispense Refill     aspirin 81 MG EC tablet Take 81 mg by mouth daily.       lisinopril (PRINIVIL,ZESTRIL) 20 MG tablet Take 1 tablet (20 mg total) by mouth daily. 90 tablet 3     metFORMIN (GLUCOPHAGE) 1000 MG tablet Take 1 tablet (1,000 mg total) by mouth 2 (two) times a day with meals. 180 tablet 3     simvastatin (ZOCOR) 40 MG tablet Take 1 tablet (40 mg total) by mouth at bedtime. 90 tablet 3     cholecalciferol, vitamin D3, (VITAMIN D3) 50 mcg (2,000 unit) Tab Take 0.5 tablets (1,000 Units total) by mouth daily. 100 tablet 0     escitalopram oxalate (LEXAPRO) 10 MG tablet Take 1 tablet (10 mg total) by mouth daily. 90 tablet 3     No current facility-administered medications for this visit.      No Known Allergies       Physical Exam     /60 (Patient Site: Left Arm, Patient Position: Sitting)   Pulse 77   Temp 97.7  F (36.5  C)   Ht 5' 10\" (1.778 m)   Wt (!) 292 lb (132.5 kg)   SpO2 98%   BMI 41.90 kg/m      General: This " is an alert male in no apparent distress.     Additional Information   Social History     Tobacco Use     Smoking status: Never Smoker     Smokeless tobacco: Never Used   Substance Use Topics     Alcohol use: Not Currently     Drug use: Not on file          Carine Hancock MD

## 2021-06-15 NOTE — PROGRESS NOTES
Enrike Daniel is a 55 y.o. male who is being evaluated via a billable telephone visit.      What phone number would you like to be contacted at? [phone]  How would you like to obtain your AVS? AVS Preference: [employer].        Telephone Visit   Enrike Daniel   55 y.o. male    Date of Visit: 3/12/2021    Chief Complaint   Patient presents with     Follow-up     medication         Assessment and Plan   1. Adjustment disorder with disturbance of conduct  He is feeling much better on the escitalopram.  He is not having any side effects and would like to continue on this dose.  He will let me know if anything changes in the future.    2. Controlled type 2 diabetes mellitus without complication, without long-term current use of insulin (H)  He is due for recheck of his labs in May and we will set that up.  - Glycosylated Hemoglobin A1c; Future  - Vitamin D, Total (25-Hydroxy); Future  - Basic Metabolic Panel; Future         No follow-ups on file.     History of Present Illness   This 55 y.o. old male is evaluated with a telephone visit today.  This is a follow-up from a visit 3 weeks ago.  At that time he was having significant anxiety and mood disorder.  He had a lot of situational stressors.  We started escitalopram and are following up today.  He states that he is tolerating the medication well and his anxiety has completely resolved.  He feels much better and would like to continue on this dose.  He has no acute concerns in this regard.  We did order a sleep clinic evaluation at that time and he has not heard yet about that appointment.  I am going to have someone reach out to him to help him get it set up.  He has no other acute concerns today.    Review of Systems: As above, systems otherwise reviewed and negative.     Medications, Allergies and Problem List   Patient Active Problem List   Diagnosis     Adjustment disorder with disturbance of conduct     Controlled type 2 diabetes mellitus without complication,  without long-term current use of insulin (H)     Esophageal reflux     Essential hypertension     Hyperlipidemia     Obstructive sleep apnea     Status post bariatric surgery     Current Outpatient Medications   Medication Sig Dispense Refill     aspirin 81 MG EC tablet Take 81 mg by mouth daily.       cholecalciferol, vitamin D3, (VITAMIN D3) 50 mcg (2,000 unit) Tab Take 0.5 tablets (1,000 Units total) by mouth daily. 100 tablet 0     escitalopram oxalate (LEXAPRO) 10 MG tablet Take 1 tablet (10 mg total) by mouth daily. 90 tablet 3     lisinopril (PRINIVIL,ZESTRIL) 20 MG tablet Take 1 tablet (20 mg total) by mouth daily. 90 tablet 3     metFORMIN (GLUCOPHAGE) 1000 MG tablet Take 1 tablet (1,000 mg total) by mouth 2 (two) times a day with meals. 180 tablet 3     simvastatin (ZOCOR) 40 MG tablet Take 1 tablet (40 mg total) by mouth at bedtime. 90 tablet 3     No current facility-administered medications for this visit.      No Known Allergies       Physical Exam     There were no vitals taken for this visit.    General: There was no shortness of breath noted during our visit.     Additional Information   Social History     Tobacco Use     Smoking status: Never Smoker     Smokeless tobacco: Never Used   Substance Use Topics     Alcohol use: Not Currently     Drug use: Not on file            Carine Hancock MD    Phone call duration: 5 minutes

## 2021-06-16 NOTE — TELEPHONE ENCOUNTER
Refill Approved    Rx renewed per Medication Renewal Policy. Medication was last renewed on 2/6/20.    Gavin Martinez, [facility] Triage/Med Refill 4/2/2021     Requested Prescriptions   Pending Prescriptions Disp Refills     lisinopriL (PRINIVIL,ZESTRIL) 20 MG tablet 90 tablet 3     Sig: Take 1 tablet (20 mg total) by mouth daily.       Ace Inhibitors Refill Protocol Passed - 4/1/2021  7:29 AM        Passed - PCP or prescribing provider visit in past 12 months       Last office visit with prescriber/PCP: 2/19/2021 Carine Hancock MD OR same dept: Visit date not found OR same specialty: Visit date not found  Last physical: 9/8/2020 Last MTM visit: Visit date not found   Next visit within 3 mo: Visit date not found  Next physical within 3 mo: Visit date not found  Prescriber OR PCP: Carine Hancock MD  Last diagnosis associated with med order: 1. Essential hypertension  - lisinopriL (PRINIVIL,ZESTRIL) 20 MG tablet; Take 1 tablet (20 mg total) by mouth daily.  Dispense: 90 tablet; Refill: 3    2. Hyperlipidemia, unspecified hyperlipidemia type  - simvastatin (ZOCOR) 40 MG tablet; Take 1 tablet (40 mg total) by mouth at bedtime.  Dispense: 90 tablet; Refill: 3    If protocol passes may refill for 12 months if within 3 months of last provider visit (or a total of 15 months).             Passed - Serum Potassium in past 12 months     Lab Results   Component Value Date    Potassium 4.2 02/18/2021             Passed - Blood pressure filed in past 12 months     BP Readings from Last 1 Encounters:   02/19/21 110/60             Passed - Serum Creatinine in past 12 months     Creatinine   Date Value Ref Range Status   02/18/2021 1.21 0.70 - 1.30 mg/dL Final                simvastatin (ZOCOR) 40 MG tablet 90 tablet 3     Sig: Take 1 tablet (40 mg total) by mouth at bedtime.       Statins Refill Protocol (Hmg CoA Reductase Inhibitors) Passed - 4/1/2021  7:29 AM        Passed - PCP or prescribing provider visit in past 12  months      Last office visit with prescriber/PCP: 2/19/2021 Carine Hancock MD OR same dept: Visit date not found OR same specialty: Visit date not found  Last physical: 9/8/2020 Last MTM visit: Visit date not found   Next visit within 3 mo: Visit date not found  Next physical within 3 mo: Visit date not found  Prescriber OR PCP: Carine Hancock MD  Last diagnosis associated with med order: 1. Essential hypertension  - lisinopriL (PRINIVIL,ZESTRIL) 20 MG tablet; Take 1 tablet (20 mg total) by mouth daily.  Dispense: 90 tablet; Refill: 3    2. Hyperlipidemia, unspecified hyperlipidemia type  - simvastatin (ZOCOR) 40 MG tablet; Take 1 tablet (40 mg total) by mouth at bedtime.  Dispense: 90 tablet; Refill: 3    If protocol passes may refill for 12 months if within 3 months of last provider visit (or a total of 15 months).

## 2021-06-17 NOTE — PATIENT INSTRUCTIONS - HE
Increase vitamin D supplement to 2000 units.       Follow up 3 months after starting new medication.

## 2021-06-17 NOTE — PROGRESS NOTES
"       SUBJECTIVE       Enrike Daniel is a 55 y.o.  male with a PMH significant for:     Patient Active Problem List   Diagnosis     Adjustment disorder with disturbance of conduct     Controlled type 2 diabetes mellitus without complication, without long-term current use of insulin (H)     Esophageal reflux     Essential hypertension     Hyperlipidemia     Obstructive sleep apnea     Status post bariatric surgery     Morbid obesity (H)     He presents to establish care.    Patient has no acute questions or concerns today.    Patient has a history of type 2 diabetes.  Upon chart review patient's last A1c was 5 days ago.  It was noted to be 7.5.  Patient notes he has had a lot of stress recently.  Both of patient's parents were diagnosed with Covid and became severely ill.  This has made it difficult for him to follow diet and exercise.  Patient is currently on max dose Metformin.  He has been taking this for the last 3 months.  Discussed increasing his medications.  Discussed addition of GLP-1.  Patient in agreement with this plan.  Patient has never taken injection therapy.  Advise referral to diabetes education for training.  Patient in agreement with this.    Patient has a history of hypertension.  Blood pressure well controlled today.  Patient also on simvastatin and aspirin.    Patient has a history of bariatric surgery.  He had a sleeve gastrectomy done.  Patient has struggled with his weight.  Patient has not seen a bariatric provider for a long time.  Discussed that when things settle down we should refer him back over there for reevaluation.  Patient in agreement with that plan.    PMHx, PSHx, Social Hx, Family Hx, Medications, and Allergies reviewed and updated in chart as needed.         REVIEW OF SYSTEMS     Pertinent items are noted in HPI.        OBJECTIVE     Vitals:    05/21/21 1241   BP: 124/62   Pulse: 71   SpO2: 96%   Weight: (!) 289 lb (131.1 kg)   Height: 5' 10.08\" (1.78 m)     Body mass index " is 41.37 kg/m .    Constitutional: Awake, alert, cooperative, no apparent distress, and appears stated age.  Eyes: Lids and lashes normal, sclera clear, conjunctiva normal.  ENT: Normocephalic, without obvious abnormality, atramatic,   Lungs: No increased work of breathing, good air exchange, clear to auscultation bilaterally, no crackles or wheezing.  Cardiovascular: Regular rate and rhythm, normal S1 and S2, no S3 or S4, and no murmur noted.  Abdomen: Normal bowel sounds, soft, non-distended, non-tender, no masses palpated, no hepatosplenomegally.  Musculoskeletal: No redness, warmth, or swelling of the joints.  Full range of motion noted.   Neurologic: Awake, alert, oriented to name, place and time.  Cranial nerves II-XII are grossly intact and gait is normal.    Results for orders placed or performed in visit on 05/15/21   Glycosylated Hemoglobin A1c   Result Value Ref Range    Hemoglobin A1c 7.5 (H) <=5.6 %   Vitamin D, Total (25-Hydroxy)   Result Value Ref Range    Vitamin D, Total (25-Hydroxy) 21.4 (L) 30.0 - 80.0 ng/mL   Basic Metabolic Panel   Result Value Ref Range    Sodium 134 (L) 136 - 145 mmol/L    Potassium 4.4 3.5 - 5.0 mmol/L    Chloride 100 98 - 107 mmol/L    CO2 29 22 - 31 mmol/L    Anion Gap, Calculation 5 5 - 18 mmol/L    Glucose 221 (H) 70 - 125 mg/dL    Calcium 9.4 8.5 - 10.5 mg/dL    BUN 15 8 - 22 mg/dL    Creatinine 1.21 0.70 - 1.30 mg/dL    GFR MDRD Af Amer >60 >60 mL/min/1.73m2    GFR MDRD Non Af Amer >60 >60 mL/min/1.73m2       ASSESSMENT AND PLAN     Enrike was seen today for establish care.    Diagnoses and all orders for this visit:    Controlled type 2 diabetes mellitus without complication, without long-term current use of insulin (H): Patient presents to establish care.  Most acute concern today is diabetes.  Patient A1c remains above 7 despite max dose Metformin therapy.  Advised that we should start a GLP-1.  Patient in agreement with this plan.  Patient will follow up with  diabetes education for teaching on how to take the medication properly.    -     semaglutide 0.25 mg or 0.5 mg(2 mg/1.5 mL) PnIj; Inject 0.25 mg under the skin every 7 days.  -     Ambulatory referral to Diabetes Education Program (CDE)    RTC in 3 months for follow-up of diabetes or sooner if develops new or worsening symptoms.    Juju Linder DO

## 2021-07-06 VITALS
DIASTOLIC BLOOD PRESSURE: 62 MMHG | SYSTOLIC BLOOD PRESSURE: 124 MMHG | HEART RATE: 71 BPM | BODY MASS INDEX: 41.37 KG/M2 | WEIGHT: 289 LBS | OXYGEN SATURATION: 96 % | HEIGHT: 70 IN

## 2021-08-03 ENCOUNTER — MYC MEDICAL ADVICE (OUTPATIENT)
Dept: EDUCATION SERVICES | Facility: CLINIC | Age: 56
End: 2021-08-03

## 2021-08-03 DIAGNOSIS — E11.9 CONTROLLED TYPE 2 DIABETES MELLITUS WITHOUT COMPLICATION, WITHOUT LONG-TERM CURRENT USE OF INSULIN (H): Primary | ICD-10-CM

## 2021-08-03 RX ORDER — SEMAGLUTIDE 1.34 MG/ML
0.5 INJECTION, SOLUTION SUBCUTANEOUS WEEKLY
Qty: 4.5 ML | Refills: 0 | Status: SHIPPED | OUTPATIENT
Start: 2021-08-03 | End: 2021-12-02

## 2021-08-21 ENCOUNTER — HEALTH MAINTENANCE LETTER (OUTPATIENT)
Age: 56
End: 2021-08-21

## 2021-10-04 ENCOUNTER — LAB (OUTPATIENT)
Dept: LAB | Facility: HOSPITAL | Age: 56
End: 2021-10-04
Payer: COMMERCIAL

## 2021-10-04 DIAGNOSIS — E11.9 DIABETES MELLITUS, TYPE 2 (H): ICD-10-CM

## 2021-10-04 LAB — HBA1C MFR BLD: 6.5 %

## 2021-10-04 PROCEDURE — 36415 COLL VENOUS BLD VENIPUNCTURE: CPT

## 2021-10-04 PROCEDURE — 83036 HEMOGLOBIN GLYCOSYLATED A1C: CPT

## 2021-10-16 ENCOUNTER — HEALTH MAINTENANCE LETTER (OUTPATIENT)
Age: 56
End: 2021-10-16

## 2021-11-26 ENCOUNTER — IMMUNIZATION (OUTPATIENT)
Dept: NURSING | Facility: CLINIC | Age: 56
End: 2021-11-26
Payer: COMMERCIAL

## 2021-11-26 PROCEDURE — 91300 PR COVID VAC PFIZER DIL RECON 30 MCG/0.3 ML IM: CPT

## 2021-11-26 PROCEDURE — 0004A PR COVID VAC PFIZER DIL RECON 30 MCG/0.3 ML IM: CPT

## 2022-01-01 ENCOUNTER — MYC MEDICAL ADVICE (OUTPATIENT)
Dept: FAMILY MEDICINE | Facility: CLINIC | Age: 57
End: 2022-01-01
Payer: COMMERCIAL

## 2022-01-01 DIAGNOSIS — E11.9 CONTROLLED TYPE 2 DIABETES MELLITUS WITHOUT COMPLICATION, WITHOUT LONG-TERM CURRENT USE OF INSULIN (H): Primary | ICD-10-CM

## 2022-01-21 ENCOUNTER — TELEPHONE (OUTPATIENT)
Dept: FAMILY MEDICINE | Facility: CLINIC | Age: 57
End: 2022-01-21
Payer: COMMERCIAL

## 2022-01-21 ENCOUNTER — LAB (OUTPATIENT)
Dept: LAB | Facility: HOSPITAL | Age: 57
End: 2022-01-21
Payer: COMMERCIAL

## 2022-01-21 DIAGNOSIS — E78.5 HYPERLIPIDEMIA: ICD-10-CM

## 2022-01-21 DIAGNOSIS — E11.9 CONTROLLED TYPE 2 DIABETES MELLITUS WITHOUT COMPLICATION, WITHOUT LONG-TERM CURRENT USE OF INSULIN (H): Primary | ICD-10-CM

## 2022-01-21 DIAGNOSIS — E11.9 CONTROLLED TYPE 2 DIABETES MELLITUS WITHOUT COMPLICATION, WITHOUT LONG-TERM CURRENT USE OF INSULIN (H): ICD-10-CM

## 2022-01-21 LAB
ANION GAP SERPL CALCULATED.3IONS-SCNC: 7 MMOL/L (ref 5–18)
BUN SERPL-MCNC: 12 MG/DL (ref 8–22)
CALCIUM SERPL-MCNC: 9.2 MG/DL (ref 8.5–10.5)
CHLORIDE BLD-SCNC: 102 MMOL/L (ref 98–107)
CHOLEST SERPL-MCNC: 125 MG/DL
CO2 SERPL-SCNC: 29 MMOL/L (ref 22–31)
CREAT SERPL-MCNC: 1.12 MG/DL (ref 0.7–1.3)
FASTING STATUS PATIENT QL REPORTED: YES
GFR SERPL CREATININE-BSD FRML MDRD: 77 ML/MIN/1.73M2
GLUCOSE BLD-MCNC: 101 MG/DL (ref 70–125)
HBA1C MFR BLD: 6 %
HDLC SERPL-MCNC: 29 MG/DL
LDLC SERPL CALC-MCNC: 72 MG/DL
POTASSIUM BLD-SCNC: 4.3 MMOL/L (ref 3.5–5)
SODIUM SERPL-SCNC: 138 MMOL/L (ref 136–145)
TRIGL SERPL-MCNC: 121 MG/DL

## 2022-01-21 PROCEDURE — 83036 HEMOGLOBIN GLYCOSYLATED A1C: CPT

## 2022-01-21 PROCEDURE — 80061 LIPID PANEL: CPT

## 2022-01-21 PROCEDURE — 80048 BASIC METABOLIC PNL TOTAL CA: CPT

## 2022-01-21 PROCEDURE — 36415 COLL VENOUS BLD VENIPUNCTURE: CPT

## 2022-01-21 NOTE — TELEPHONE ENCOUNTER
Called [facility] - Unsure if pt was fasting as they jessica the blood and sent him on his way    If pt fasting do fasting lipids  Other wise A1c and BMP per VO Dr Linder and rest of labs will be done at appt 1/28/22

## 2022-01-21 NOTE — TELEPHONE ENCOUNTER
Reason for Call: Request for an order or referral:    Order or referral being requested:   Pt at [facility] to do labs    Need orders placed    Date needed: as soon as possible    Has the patient been seen by the PCP for this problem? YES    Additional comments: n/a    Phone number Patient can be reached at:  Other phone number:  [phone]    Best Time:  asap    Can we leave a detailed message on this number?  YES    Call taken on 1/21/2022 at 11:11 AM by Pooja Kimble

## 2022-01-28 ENCOUNTER — OFFICE VISIT (OUTPATIENT)
Dept: FAMILY MEDICINE | Facility: CLINIC | Age: 57
End: 2022-01-28
Payer: COMMERCIAL

## 2022-01-28 VITALS
WEIGHT: 271.3 LBS | SYSTOLIC BLOOD PRESSURE: 122 MMHG | BODY MASS INDEX: 38.84 KG/M2 | HEART RATE: 70 BPM | OXYGEN SATURATION: 98 % | DIASTOLIC BLOOD PRESSURE: 60 MMHG

## 2022-01-28 DIAGNOSIS — E11.9 CONTROLLED TYPE 2 DIABETES MELLITUS WITHOUT COMPLICATION, WITHOUT LONG-TERM CURRENT USE OF INSULIN (H): Primary | ICD-10-CM

## 2022-01-28 DIAGNOSIS — I10 ESSENTIAL HYPERTENSION: ICD-10-CM

## 2022-01-28 PROCEDURE — 99214 OFFICE O/P EST MOD 30 MIN: CPT | Performed by: STUDENT IN AN ORGANIZED HEALTH CARE EDUCATION/TRAINING PROGRAM

## 2022-01-28 NOTE — PROGRESS NOTES
ASSESSMENT AND PLAN     Enrike was seen today for follow up.    Diagnoses and all orders for this visit:    Controlled type 2 diabetes mellitus without complication, without long-term current use of insulin (H): Patient here for diabetic follow-up. A1c has dropped from 6.5 to 6.0. Patient currently maintained on Metformin and Ozempic. Advised him to continue these medications. We will follow up again in 6 months    Essential hypertension: Blood pressure well controlled at this time. Maintained on lisinopril.    RTC in 6 months for annual wellness visit or sooner if develops new or worsening symptoms.    Juju Linder DO           SUBJECTIVE       Enrike MYLENE Daniel is a 56 year old  male with a PMH significant for:     Patient Active Problem List   Diagnosis     Morbid obesity (H)     Adjustment disorder with disturbance of conduct     Controlled type 2 diabetes mellitus without complication, without long-term current use of insulin (H)     Essential hypertension     Hyperlipidemia     Status post bariatric surgery     He presents for follow-up of his diabetes    Type 2 Diabetes Assessment  Do you have any questions about your diabetes today? no  How often do you check your blood sugar? As needed  What are your average readings? NA  Have you had any low blood sugars? no  Do/will you need Refills on meds or testing supplies soon? no  How often do you miss taking your medicine? None  Changes to diabetes care today: no  Future orders placed in Epic this visit? (A1c, microalbumin, BMP): no  F/u plan (next appt date or testing): 6 months    Patient gets his diabetic eye exams at [facility]. He recently just had one. We will plan to do a foot exam today.    Patient has no additional questions or concerns. Reviewed his recent labs with him. Everything looks good.    PMH, Medications and Allergies were reviewed and updated as needed.        REVIEW OF SYSTEMS     Pertinent items are noted in HPI.        OBJECTIVE     Vitals:     01/28/22 1230   BP: 122/60   BP Location: Left arm   Patient Position: Sitting   Cuff Size: Adult Large   Pulse: 70   SpO2: 98%   Weight: 123.1 kg (271 lb 4.8 oz)     Body mass index is 38.84 kg/m .      Constitutional: Awake, alert, cooperative, no apparent distress, and appears stated age.  Eyes: Lids and lashes normal, sclera clear, conjunctiva normal.  ENT: Normocephalic, without obvious abnormality, atramatic,   Musculoskeletal: No redness, warmth, or swelling of the joints.  Full range of motion noted.   Neurologic: Awake, alert, oriented to name, place and time.  Cranial nerves II-XII are grossly intact.  Diabetic foot: No lesions noted. All sensations noted besides the heels bilaterally    No results found for this or any previous visit (from the past 24 hour(s)).

## 2022-02-22 ENCOUNTER — TRANSFERRED RECORDS (OUTPATIENT)
Dept: HEALTH INFORMATION MANAGEMENT | Facility: CLINIC | Age: 57
End: 2022-02-22

## 2022-02-22 LAB — RETINOPATHY: NORMAL

## 2022-05-28 ENCOUNTER — HEALTH MAINTENANCE LETTER (OUTPATIENT)
Age: 57
End: 2022-05-28

## 2022-05-29 DIAGNOSIS — E11.9 CONTROLLED TYPE 2 DIABETES MELLITUS WITHOUT COMPLICATION, WITHOUT LONG-TERM CURRENT USE OF INSULIN (H): ICD-10-CM

## 2022-05-30 RX ORDER — SEMAGLUTIDE 1.34 MG/ML
INJECTION, SOLUTION SUBCUTANEOUS
Qty: 1.5 ML | Refills: 3 | Status: SHIPPED | OUTPATIENT
Start: 2022-05-30 | End: 2022-10-27

## 2022-05-31 NOTE — TELEPHONE ENCOUNTER
"Last Written Prescription Date:  12/2/21  Last Fill Quantity: 1.5,  # refills: 3   Last office visit provider:  1/28/22     Requested Prescriptions   Pending Prescriptions Disp Refills     OZEMPIC (0.25 OR 0.5 MG/DOSE) 2 MG/1.5ML SOPN pen [Pharmacy Med Name: OZEMPIC 0.25 OR 0.5MG/DOS 1X2MG PEN] 1.5 mL 3     Sig: INJECT 0.25 MG SUBCUTANEOUS EVERY 7 DAYS       GLP-1 Agonists Protocol Passed - 5/29/2022 10:42 AM        Passed - HgbA1C in past 3 or 6 months     If HgbA1C is 8 or greater, it needs to be on file within the past 3 months.  If less than 8, must be on file within the past 6 months.     Recent Labs   Lab Test 01/21/22  1135   A1C 6.0*             Passed - Medication is active on med list        Passed - Patient is age 18 or older        Passed - Normal serum creatinine on file in past 12 months     Recent Labs   Lab Test 01/21/22  1135   CR 1.12       Ok to refill medication if creatinine is low          Passed - Recent (6 mo) or future (30 days) visit within the authorizing provider's specialty     Patient had office visit in the last 6 months or has a visit in the next 30 days with authorizing provider.  See \"Patient Info\" tab in inbasket, or \"Choose Columns\" in Meds & Orders section of the refill encounter.                 Maddi Ramirez RN 05/30/22 7:07 PM  "

## 2022-06-17 ENCOUNTER — IMMUNIZATION (OUTPATIENT)
Dept: NURSING | Facility: CLINIC | Age: 57
End: 2022-06-17
Payer: COMMERCIAL

## 2022-06-17 PROCEDURE — 91305 COVID-19,PF,PFIZER (12+ YRS): CPT

## 2022-06-17 PROCEDURE — 0054A COVID-19,PF,PFIZER (12+ YRS): CPT

## 2022-07-05 DIAGNOSIS — E55.9 VITAMIN D DEFICIENCY: ICD-10-CM

## 2022-07-05 DIAGNOSIS — I10 ESSENTIAL HYPERTENSION: ICD-10-CM

## 2022-07-05 DIAGNOSIS — E11.9 CONTROLLED TYPE 2 DIABETES MELLITUS WITHOUT COMPLICATION, WITHOUT LONG-TERM CURRENT USE OF INSULIN (H): Primary | ICD-10-CM

## 2022-08-29 ENCOUNTER — LAB (OUTPATIENT)
Dept: LAB | Facility: CLINIC | Age: 57
End: 2022-08-29
Payer: COMMERCIAL

## 2022-08-29 DIAGNOSIS — I10 ESSENTIAL HYPERTENSION: ICD-10-CM

## 2022-08-29 DIAGNOSIS — E55.9 VITAMIN D DEFICIENCY: ICD-10-CM

## 2022-08-29 DIAGNOSIS — E11.9 CONTROLLED TYPE 2 DIABETES MELLITUS WITHOUT COMPLICATION, WITHOUT LONG-TERM CURRENT USE OF INSULIN (H): ICD-10-CM

## 2022-08-29 LAB
ALBUMIN SERPL BCG-MCNC: 4.4 G/DL (ref 3.5–5.2)
ALP SERPL-CCNC: 60 U/L (ref 40–129)
ALT SERPL W P-5'-P-CCNC: 14 U/L (ref 10–50)
ANION GAP SERPL CALCULATED.3IONS-SCNC: 8 MMOL/L (ref 7–15)
AST SERPL W P-5'-P-CCNC: 28 U/L (ref 10–50)
BILIRUB SERPL-MCNC: 0.4 MG/DL
BUN SERPL-MCNC: 11.5 MG/DL (ref 6–20)
CALCIUM SERPL-MCNC: 9.7 MG/DL (ref 8.6–10)
CHLORIDE SERPL-SCNC: 100 MMOL/L (ref 98–107)
CREAT SERPL-MCNC: 1.16 MG/DL (ref 0.67–1.17)
DEPRECATED CALCIDIOL+CALCIFEROL SERPL-MC: 31 UG/L (ref 20–75)
DEPRECATED HCO3 PLAS-SCNC: 27 MMOL/L (ref 22–29)
GFR SERPL CREATININE-BSD FRML MDRD: 73 ML/MIN/1.73M2
GLUCOSE SERPL-MCNC: 110 MG/DL (ref 70–99)
HBA1C MFR BLD: 6.9 % (ref 0–5.6)
POTASSIUM SERPL-SCNC: 4.5 MMOL/L (ref 3.4–5.3)
PROT SERPL-MCNC: 7.4 G/DL (ref 6.4–8.3)
SODIUM SERPL-SCNC: 135 MMOL/L (ref 136–145)

## 2022-08-29 PROCEDURE — 36415 COLL VENOUS BLD VENIPUNCTURE: CPT

## 2022-08-29 PROCEDURE — 80053 COMPREHEN METABOLIC PANEL: CPT

## 2022-08-29 PROCEDURE — 82306 VITAMIN D 25 HYDROXY: CPT

## 2022-08-29 PROCEDURE — 82043 UR ALBUMIN QUANTITATIVE: CPT

## 2022-08-29 PROCEDURE — 83036 HEMOGLOBIN GLYCOSYLATED A1C: CPT

## 2022-08-30 LAB
CREAT UR-MCNC: 128 MG/DL
MICROALBUMIN UR-MCNC: <12 MG/L
MICROALBUMIN/CREAT UR: NORMAL MG/G{CREAT}

## 2022-08-30 ASSESSMENT — PATIENT HEALTH QUESTIONNAIRE - PHQ9
SUM OF ALL RESPONSES TO PHQ QUESTIONS 1-9: 11
SUM OF ALL RESPONSES TO PHQ QUESTIONS 1-9: 11
10. IF YOU CHECKED OFF ANY PROBLEMS, HOW DIFFICULT HAVE THESE PROBLEMS MADE IT FOR YOU TO DO YOUR WORK, TAKE CARE OF THINGS AT HOME, OR GET ALONG WITH OTHER PEOPLE: VERY DIFFICULT

## 2022-09-02 ENCOUNTER — OFFICE VISIT (OUTPATIENT)
Dept: FAMILY MEDICINE | Facility: CLINIC | Age: 57
End: 2022-09-02
Payer: COMMERCIAL

## 2022-09-02 VITALS
BODY MASS INDEX: 39.8 KG/M2 | DIASTOLIC BLOOD PRESSURE: 60 MMHG | OXYGEN SATURATION: 98 % | WEIGHT: 278 LBS | HEART RATE: 67 BPM | SYSTOLIC BLOOD PRESSURE: 126 MMHG

## 2022-09-02 DIAGNOSIS — E11.9 CONTROLLED TYPE 2 DIABETES MELLITUS WITHOUT COMPLICATION, WITHOUT LONG-TERM CURRENT USE OF INSULIN (H): Primary | ICD-10-CM

## 2022-09-02 PROCEDURE — 90677 PCV20 VACCINE IM: CPT | Performed by: STUDENT IN AN ORGANIZED HEALTH CARE EDUCATION/TRAINING PROGRAM

## 2022-09-02 PROCEDURE — 99214 OFFICE O/P EST MOD 30 MIN: CPT | Mod: 25 | Performed by: STUDENT IN AN ORGANIZED HEALTH CARE EDUCATION/TRAINING PROGRAM

## 2022-09-02 PROCEDURE — 90472 IMMUNIZATION ADMIN EACH ADD: CPT | Performed by: STUDENT IN AN ORGANIZED HEALTH CARE EDUCATION/TRAINING PROGRAM

## 2022-09-02 PROCEDURE — 90471 IMMUNIZATION ADMIN: CPT | Performed by: STUDENT IN AN ORGANIZED HEALTH CARE EDUCATION/TRAINING PROGRAM

## 2022-09-02 PROCEDURE — 90750 HZV VACC RECOMBINANT IM: CPT | Performed by: STUDENT IN AN ORGANIZED HEALTH CARE EDUCATION/TRAINING PROGRAM

## 2022-09-02 ASSESSMENT — PATIENT HEALTH QUESTIONNAIRE - PHQ9
SUM OF ALL RESPONSES TO PHQ QUESTIONS 1-9: 11
10. IF YOU CHECKED OFF ANY PROBLEMS, HOW DIFFICULT HAVE THESE PROBLEMS MADE IT FOR YOU TO DO YOUR WORK, TAKE CARE OF THINGS AT HOME, OR GET ALONG WITH OTHER PEOPLE: VERY DIFFICULT
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
## 2022-10-01 ENCOUNTER — HEALTH MAINTENANCE LETTER (OUTPATIENT)
Age: 57
End: 2022-10-01

## 2022-10-08 ENCOUNTER — IMMUNIZATION (OUTPATIENT)
Dept: PEDIATRICS | Facility: CLINIC | Age: 57
End: 2022-10-08
Payer: COMMERCIAL

## 2022-10-08 ENCOUNTER — MYC MEDICAL ADVICE (OUTPATIENT)
Dept: EDUCATION SERVICES | Facility: CLINIC | Age: 57
End: 2022-10-08

## 2022-10-08 DIAGNOSIS — Z23 NEED FOR PROPHYLACTIC VACCINATION AND INOCULATION AGAINST INFLUENZA: Primary | ICD-10-CM

## 2022-10-08 PROCEDURE — 90471 IMMUNIZATION ADMIN: CPT

## 2022-10-08 PROCEDURE — 90682 RIV4 VACC RECOMBINANT DNA IM: CPT

## 2022-10-08 PROCEDURE — 99207 PR NO CHARGE NURSE ONLY: CPT

## 2022-10-15 ENCOUNTER — IMMUNIZATION (OUTPATIENT)
Dept: FAMILY MEDICINE | Facility: CLINIC | Age: 57
End: 2022-10-15
Payer: COMMERCIAL

## 2022-10-15 DIAGNOSIS — Z23 HIGH PRIORITY FOR 2019-NCOV VACCINE: Primary | ICD-10-CM

## 2022-10-15 PROCEDURE — 0124A COVID-19,PF,PFIZER BOOSTER BIVALENT: CPT

## 2022-10-15 PROCEDURE — 91312 COVID-19,PF,PFIZER BOOSTER BIVALENT: CPT

## 2022-10-26 ENCOUNTER — MYC MEDICAL ADVICE (OUTPATIENT)
Dept: FAMILY MEDICINE | Facility: CLINIC | Age: 57
End: 2022-10-26

## 2022-10-26 DIAGNOSIS — E11.9 CONTROLLED TYPE 2 DIABETES MELLITUS WITHOUT COMPLICATION, WITHOUT LONG-TERM CURRENT USE OF INSULIN (H): ICD-10-CM

## 2022-10-27 RX ORDER — SEMAGLUTIDE 1.34 MG/ML
INJECTION, SOLUTION SUBCUTANEOUS
Qty: 1.5 ML | Refills: 3 | Status: SHIPPED | OUTPATIENT
Start: 2022-10-27 | End: 2023-02-17

## 2022-11-20 ENCOUNTER — MYC MEDICAL ADVICE (OUTPATIENT)
Dept: FAMILY MEDICINE | Facility: CLINIC | Age: 57
End: 2022-11-20

## 2022-11-20 ENCOUNTER — LAB (OUTPATIENT)
Dept: LAB | Facility: CLINIC | Age: 57
End: 2022-11-20
Payer: COMMERCIAL

## 2022-11-20 DIAGNOSIS — E11.9 CONTROLLED TYPE 2 DIABETES MELLITUS WITHOUT COMPLICATION, WITHOUT LONG-TERM CURRENT USE OF INSULIN (H): ICD-10-CM

## 2022-11-20 DIAGNOSIS — E11.9 CONTROLLED TYPE 2 DIABETES MELLITUS WITHOUT COMPLICATION, WITHOUT LONG-TERM CURRENT USE OF INSULIN (H): Primary | ICD-10-CM

## 2022-11-20 LAB — HBA1C MFR BLD: 7.4 % (ref 0–5.6)

## 2022-11-20 PROCEDURE — 36415 COLL VENOUS BLD VENIPUNCTURE: CPT

## 2022-11-20 PROCEDURE — 83036 HEMOGLOBIN GLYCOSYLATED A1C: CPT

## 2022-11-21 RX ORDER — SEMAGLUTIDE 1.34 MG/ML
0.5 INJECTION, SOLUTION SUBCUTANEOUS
Qty: 4.5 ML | Refills: 0 | Status: SHIPPED | OUTPATIENT
Start: 2022-11-21 | End: 2023-03-20

## 2022-12-21 ENCOUNTER — MYC MEDICAL ADVICE (OUTPATIENT)
Dept: FAMILY MEDICINE | Facility: CLINIC | Age: 57
End: 2022-12-21

## 2022-12-21 DIAGNOSIS — E11.9 CONTROLLED TYPE 2 DIABETES MELLITUS WITHOUT COMPLICATION, WITHOUT LONG-TERM CURRENT USE OF INSULIN (H): Primary | ICD-10-CM

## 2022-12-21 DIAGNOSIS — E78.5 HYPERLIPIDEMIA, UNSPECIFIED HYPERLIPIDEMIA TYPE: ICD-10-CM

## 2023-01-28 ENCOUNTER — TRANSFERRED RECORDS (OUTPATIENT)
Dept: MULTI SPECIALTY CLINIC | Facility: CLINIC | Age: 58
End: 2023-01-28
Payer: COMMERCIAL

## 2023-01-28 LAB — RETINOPATHY: NORMAL

## 2023-02-04 ENCOUNTER — HEALTH MAINTENANCE LETTER (OUTPATIENT)
Age: 58
End: 2023-02-04

## 2023-02-11 ENCOUNTER — LAB (OUTPATIENT)
Dept: LAB | Facility: CLINIC | Age: 58
End: 2023-02-11
Payer: COMMERCIAL

## 2023-02-11 DIAGNOSIS — E11.9 CONTROLLED TYPE 2 DIABETES MELLITUS WITHOUT COMPLICATION, WITHOUT LONG-TERM CURRENT USE OF INSULIN (H): ICD-10-CM

## 2023-02-11 DIAGNOSIS — E78.5 HYPERLIPIDEMIA, UNSPECIFIED HYPERLIPIDEMIA TYPE: ICD-10-CM

## 2023-02-11 LAB
ERYTHROCYTE [DISTWIDTH] IN BLOOD BY AUTOMATED COUNT: 12.1 % (ref 10–15)
HBA1C MFR BLD: 7.4 % (ref 0–5.6)
HCT VFR BLD AUTO: 41.8 % (ref 40–53)
HGB BLD-MCNC: 14.9 G/DL (ref 13.3–17.7)
MCH RBC QN AUTO: 31.2 PG (ref 26.5–33)
MCHC RBC AUTO-ENTMCNC: 35.6 G/DL (ref 31.5–36.5)
MCV RBC AUTO: 88 FL (ref 78–100)
PLATELET # BLD AUTO: 252 10E3/UL (ref 150–450)
RBC # BLD AUTO: 4.77 10E6/UL (ref 4.4–5.9)
WBC # BLD AUTO: 7.3 10E3/UL (ref 4–11)

## 2023-02-11 PROCEDURE — 80061 LIPID PANEL: CPT

## 2023-02-11 PROCEDURE — 36415 COLL VENOUS BLD VENIPUNCTURE: CPT

## 2023-02-11 PROCEDURE — 83036 HEMOGLOBIN GLYCOSYLATED A1C: CPT

## 2023-02-11 PROCEDURE — 85027 COMPLETE CBC AUTOMATED: CPT

## 2023-02-12 LAB
CHOLEST SERPL-MCNC: 152 MG/DL
HDLC SERPL-MCNC: 33 MG/DL
LDLC SERPL CALC-MCNC: 82 MG/DL
NONHDLC SERPL-MCNC: 119 MG/DL
TRIGL SERPL-MCNC: 187 MG/DL

## 2023-02-17 ENCOUNTER — OFFICE VISIT (OUTPATIENT)
Dept: PEDIATRICS | Facility: CLINIC | Age: 58
End: 2023-02-17
Payer: COMMERCIAL

## 2023-02-17 ENCOUNTER — MYC MEDICAL ADVICE (OUTPATIENT)
Dept: PEDIATRICS | Facility: CLINIC | Age: 58
End: 2023-02-17

## 2023-02-17 VITALS
TEMPERATURE: 97.5 F | HEIGHT: 70 IN | WEIGHT: 290.3 LBS | OXYGEN SATURATION: 98 % | SYSTOLIC BLOOD PRESSURE: 142 MMHG | RESPIRATION RATE: 20 BRPM | HEART RATE: 85 BPM | BODY MASS INDEX: 41.56 KG/M2 | DIASTOLIC BLOOD PRESSURE: 72 MMHG

## 2023-02-17 DIAGNOSIS — I10 ESSENTIAL HYPERTENSION: Primary | ICD-10-CM

## 2023-02-17 DIAGNOSIS — E66.01 MORBID OBESITY (H): ICD-10-CM

## 2023-02-17 DIAGNOSIS — E11.9 CONTROLLED TYPE 2 DIABETES MELLITUS WITHOUT COMPLICATION, WITHOUT LONG-TERM CURRENT USE OF INSULIN (H): Primary | ICD-10-CM

## 2023-02-17 DIAGNOSIS — Z12.5 SCREENING FOR PROSTATE CANCER: ICD-10-CM

## 2023-02-17 PROCEDURE — 99213 OFFICE O/P EST LOW 20 MIN: CPT | Performed by: STUDENT IN AN ORGANIZED HEALTH CARE EDUCATION/TRAINING PROGRAM

## 2023-02-17 RX ORDER — GLUCOSAMINE HCL/CHONDROITIN SU 500-400 MG
CAPSULE ORAL
Qty: 100 EACH | Refills: 3 | Status: SHIPPED | OUTPATIENT
Start: 2023-02-17

## 2023-02-17 RX ORDER — LANCETS
EACH MISCELLANEOUS
Qty: 100 EACH | Refills: 6 | Status: SHIPPED | OUTPATIENT
Start: 2023-02-17

## 2023-02-17 ASSESSMENT — PAIN SCALES - GENERAL: PAINLEVEL: NO PAIN (0)

## 2023-02-17 NOTE — PATIENT INSTRUCTIONS
So nice to meet you!      Let's start with checking blood sugar once per day and keeping a log of it!      Increase the Ozempic to 1mg        Consider buying the Omron home automatic blood pressure cuff. Available on [employer], at pharmacies.  I recommend checking your blood pressure 2-3 times per week at around the same time every day.  Keep a log in a notebook or in a note in your cell phone.

## 2023-02-17 NOTE — PROGRESS NOTES
Assessment & Plan     Controlled type 2 diabetes mellitus without complication, without long-term current use of insulin (H)  Recommend continue metformin 2000mg daily, increase ozempic to 1mg weekly, start checking blood sugars at home, meet with diabetes educator, and follow up in 3 months with labs before hand. On ACE-I and statin.  - blood glucose monitoring (NO BRAND SPECIFIED) meter device kit; Use to test blood sugar 2 times daily or as directed. Preferred blood glucose meter OR supplies to accompany: Blood Glucose Monitor Brands: per insurance.  - blood glucose (NO BRAND SPECIFIED) test strip; Use to test blood sugar 2 times daily or as directed. To accompany: Blood Glucose Monitor Brands: per insurance.  - blood glucose calibration (NO BRAND SPECIFIED) solution; To accompany: Blood Glucose Monitor Brands: per insurance.  - thin (NO BRAND SPECIFIED) lancets; Use with lanceting device. To accompany: Blood Glucose Monitor Brands: per insurance.  - alcohol swab prep pads; Use to swab area of injection/ramos as directed.  - Semaglutide, 1 MG/DOSE, (OZEMPIC) 4 MG/3ML pen; Inject 1 mg Subcutaneous every 7 days  - [facility] Adult Diabetes Educator Referral; Future  - Comprehensive metabolic panel (BMP + Alb, Alk Phos, ALT, AST, Total. Bili, TP); Future  - Hemoglobin A1c; Future    Morbid obesity (H)  - Semaglutide, 1 MG/DOSE, (OZEMPIC) 4 MG/3ML pen; Inject 1 mg Subcutaneous every 7 days    Screening for prostate cancer  - PSA, screen; Future      Return in about 3 months (around 5/17/2023).   -annual physical  -switch to olmesartan?    Bernadette Lott MD  [facility] SUSANNE Calvert is a 57 year old, presenting for the following health issues:  Diabetes      History of Present Illness       Diabetes:   He presents for follow up of diabetes.  He is not checking blood glucose. He has no concerns regarding his diabetes at this time.  He is not experiencing numbness or burning in feet, excessive  "thirst, blurry vision, weight changes or redness, sores or blisters on feet. The patient has had a diabetic eye exam in the last 12 months. Eye exam performed on 01/28/2023. Location of last eye exam [facility]  [address] [place] [address].        Hypertension: He presents for follow up of hypertension.  He does not check blood pressure  regularly outside of the clinic. Outpatient blood pressures have not been over 140/90. He follows a low salt diet.       Delivers order to [facility]  -main physical activity      Diabetes mellitus  -10+ years  -had bariatric surgery   -feels constantly hungry          Objective    BP (!) 142/72   Pulse 85   Temp 97.5  F (36.4  C) (Temporal)   Resp 20   Ht 1.778 m (5' 10\")   Wt 131.7 kg (290 lb 4.8 oz)   SpO2 98%   BMI 41.65 kg/m    Body mass index is 41.65 kg/m .  Physical Exam   GEN: Alert and appropriately interactive for age  EYES: Eyes grossly normal to inspection, conjunctivae and sclerae normal  RESP: Breathing comfortably on room air   CV: Warm and well perfused peripheral extremities   MS: no gross musculoskeletal defects noted, no edema  NEURO: Alert and oriented. Gait normal. Speech fluent.    PSYCH: Affect normal/bright. Appearance well groomed.         I spent 30 minutes with the patient, greater than 50% of that time was spent in counseling or coordination of the above issues.    "

## 2023-02-20 ENCOUNTER — ANCILLARY PROCEDURE (OUTPATIENT)
Dept: GENERAL RADIOLOGY | Facility: CLINIC | Age: 58
End: 2023-02-20
Attending: STUDENT IN AN ORGANIZED HEALTH CARE EDUCATION/TRAINING PROGRAM
Payer: OTHER MISCELLANEOUS

## 2023-02-20 ENCOUNTER — OFFICE VISIT (OUTPATIENT)
Dept: URGENT CARE | Facility: URGENT CARE | Age: 58
End: 2023-02-20
Payer: OTHER MISCELLANEOUS

## 2023-02-20 VITALS
TEMPERATURE: 99.2 F | DIASTOLIC BLOOD PRESSURE: 87 MMHG | WEIGHT: 285 LBS | OXYGEN SATURATION: 97 % | HEART RATE: 81 BPM | BODY MASS INDEX: 40.89 KG/M2 | SYSTOLIC BLOOD PRESSURE: 149 MMHG

## 2023-02-20 DIAGNOSIS — M54.6 ACUTE LEFT-SIDED THORACIC BACK PAIN: Primary | ICD-10-CM

## 2023-02-20 DIAGNOSIS — M54.6 ACUTE LEFT-SIDED THORACIC BACK PAIN: ICD-10-CM
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
## 2023-02-20 PROCEDURE — 99214 OFFICE O/P EST MOD 30 MIN: CPT | Performed by: STUDENT IN AN ORGANIZED HEALTH CARE EDUCATION/TRAINING PROGRAM

## 2023-02-20 PROCEDURE — 71101 X-RAY EXAM UNILAT RIBS/CHEST: CPT | Mod: TC | Performed by: RADIOLOGY

## 2023-02-20 RX ORDER — BACLOFEN 10 MG/1
10 TABLET ORAL 3 TIMES DAILY
Qty: 15 TABLET | Refills: 0 | Status: SHIPPED | OUTPATIENT
Start: 2023-02-20 | End: 2023-02-24

## 2023-02-20 NOTE — LETTER
February 20, 2023      Enrike Daniel  [address]   [place] [address]        To Whom It May Concern:    Enrike Daniel  was seen on 2/20/23.  Please excuse him 2/23-2/24 due to injury.        Sincerely,        Talia Myers PA-C

## 2023-02-21 NOTE — PROGRESS NOTES
"ASSESSMENT & PLAN:   Diagnoses and all orders for this visit:  Acute left-sided thoracic back pain  -     XR Ribs & Chest Left G/E 3 Views; Future  -     baclofen (LIORESAL) 10 MG tablet; Take 1 tablet (10 mg) by mouth 3 times daily  Fall, initial encounter    Localized left-sided thoracic back pain after fall at work today. XR negative. Likely soft tissue injury and muscle spasm - baclofen as needed, OTC analgesics, ice/heat to area. Advised no heavy lifting. Work note provided.    No follow-ups on file.    There are no Patient Instructions on file for this visit.    At the end of the encounter, I discussed results, diagnosis, medications. Discussed red flags for immediate return to clinic/ER, as well as indications for follow up if no improvement. Patient and/or caregiver understood and agreed to plan. Patient was stable for discharge.    ------------------------------------------------------------------------  SUBJECTIVE  Patient presents with:  Work Comp  Back Pain: Start today slipped on ice sx left mid back, spasms, constant sharp pain, currently- 10/10 at worst-15 tx none     HPI  Enrike Daniel is a(n) 57 year old male presenting to clinic today for evaluation after fall that occurred at work this morning. He slipped on ice and landed straight on his back. Pain at left lower thoracic back with radiation to left side ribs. Pain is constant but sometimes has sharp \"pulsation\". Also has increased shortness of breath and some pain with deep breaths. No leg weakness, numbness, tingling. No incontinence or difficulty using bathroom. Pain improved with nothing. Pain worsened with standing, sitting, twisting.     This was a worker's compensation visit - only information relevant to this worker's compensation injury is included in this note.       Review of Systems       No Known Allergies      OBJECTIVE  Vitals:    02/20/23 1916   BP: (!) 149/87   Pulse: 81   Temp: 99.2  F (37.3  C)   SpO2: 97%   Weight: 129.3 kg " (285 lb)     Physical Exam   GENERAL: healthy, alert, no acute distress.   MSK: torso without ecchymosis or edema. No tenderness of spinous process, SI joint, iliac crest bilaterally. Localized tenderness to left thoracic paraspinal muscles. Slow to stand. 5/5 knee flexion and extension. Normal gait. Sensation of lower extremities intact and symmetric.     Xrays were preliminarily reviewed by me - negative.     Results for orders placed or performed in visit on 02/20/23   XR Ribs & Chest Left G/E 3 Views     Status: None    Narrative    EXAM: XR RIBS AND CHEST LEFT 3 VIEWS  LOCATION: [facility]  DATE/TIME: 2/20/2023 7:55 PM    INDICATION: Fall, pain  COMPARISON: None.      Impression    IMPRESSION: No acute left rib fracture. Left lower ribs are suboptimally visualized. No pleural effusion. No pneumothorax. No infiltrate.

## 2023-02-23 DIAGNOSIS — E11.9 CONTROLLED TYPE 2 DIABETES MELLITUS WITHOUT COMPLICATION, WITHOUT LONG-TERM CURRENT USE OF INSULIN (H): ICD-10-CM

## 2023-02-23 DIAGNOSIS — E78.5 HYPERLIPIDEMIA, UNSPECIFIED HYPERLIPIDEMIA TYPE: ICD-10-CM

## 2023-02-23 DIAGNOSIS — I10 ESSENTIAL HYPERTENSION: ICD-10-CM

## 2023-02-23 DIAGNOSIS — F43.24 ADJUSTMENT DISORDER WITH DISTURBANCE OF CONDUCT: ICD-10-CM

## 2023-02-24 ENCOUNTER — OFFICE VISIT (OUTPATIENT)
Dept: INTERNAL MEDICINE | Facility: CLINIC | Age: 58
End: 2023-02-24
Payer: OTHER MISCELLANEOUS

## 2023-02-24 VITALS
WEIGHT: 284.3 LBS | DIASTOLIC BLOOD PRESSURE: 74 MMHG | BODY MASS INDEX: 40.7 KG/M2 | SYSTOLIC BLOOD PRESSURE: 122 MMHG | HEIGHT: 70 IN | TEMPERATURE: 97.6 F | RESPIRATION RATE: 18 BRPM | OXYGEN SATURATION: 97 % | HEART RATE: 78 BPM
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
## 2023-02-24 DIAGNOSIS — M54.6 ACUTE BILATERAL THORACIC BACK PAIN: ICD-10-CM

## 2023-02-24 PROCEDURE — 99213 OFFICE O/P EST LOW 20 MIN: CPT | Performed by: INTERNAL MEDICINE

## 2023-02-24 RX ORDER — ESCITALOPRAM OXALATE 10 MG/1
10 TABLET ORAL DAILY
Qty: 90 TABLET | Refills: 3 | Status: SHIPPED | OUTPATIENT
Start: 2023-02-24 | End: 2024-03-21

## 2023-02-24 RX ORDER — LISINOPRIL 20 MG/1
20 TABLET ORAL DAILY
Qty: 90 TABLET | Refills: 3 | Status: SHIPPED | OUTPATIENT
Start: 2023-02-24 | End: 2023-05-12

## 2023-02-24 RX ORDER — SIMVASTATIN 40 MG
40 TABLET ORAL AT BEDTIME
Qty: 90 TABLET | Refills: 3 | Status: SHIPPED | OUTPATIENT
Start: 2023-02-24 | End: 2024-05-01

## 2023-02-24 NOTE — LETTER
February 24, 2023      Enrike CHAKRABORTY Daniel  [address]   [place] [address]        To Whom It May Concern:    Enrike Daniel was seen in our clinic. He may return to work without restrictions (back to full duties) starting tomorrow 2/25/2023.      Sincerely,        Azar Lipscomb MD

## 2023-02-24 NOTE — PROGRESS NOTES
"  Assessment & Plan     Fall, subsequent encounter  Acute bilateral thoracic back pain  -sx seem to be quite minimal at this time  Ok to return to work  discharge any baclofen before driving       Review of the result(s) of each unique test - xray        MED REC REQUIRED  Post Medication Reconciliation Status:       BMI:   Estimated body mass index is 40.79 kg/m  as calculated from the following:    Height as of this encounter: 1.778 m (5' 10\").    Weight as of this encounter: 129 kg (284 lb 4.8 oz).       See Patient Instructions    No follow-ups on file.    Azar Lipscomb MD  [facility] Cal[facility] is a 57 year old, presenting for the following health issues:  UC Follow-Up      HPI     ED/UC Followup:    Facility:  [facility]  Date of visit: 02/20/2023  Reason for visit: fall at work  Current Status: improved        Review of Systems         Objective    /74   Pulse 78   Temp 97.6  F (36.4  C) (Temporal)   Resp 18   Ht 1.778 m (5' 10\")   Wt 129 kg (284 lb 4.8 oz)   SpO2 97%   BMI 40.79 kg/m    Body mass index is 40.79 kg/m .  Physical Exam   GENERAL: alert, no distress and obese  MS: trunk- nontender to palpation, extremities normal- no gross deformities noted                    " Facial contusion

## 2023-02-24 NOTE — TELEPHONE ENCOUNTER
Routing refill request to provider for review/approval because:  BP Readings from Last 3 Encounters:   02/20/23 (!) 149/87   02/17/23 (!) 142/72   09/02/22 126/60

## 2023-03-20 RX ORDER — OLMESARTAN MEDOXOMIL 40 MG/1
40 TABLET ORAL DAILY
Qty: 90 TABLET | Refills: 1 | Status: SHIPPED | OUTPATIENT
Start: 2023-03-20 | End: 2023-09-14

## 2023-03-20 NOTE — TELEPHONE ENCOUNTER
Switch lisinopril to olmesartan. Has follow up with me in May.  Continue ozempic.    Bernadette Lott MD  Internal Medicine & Pediatrics  [facility]  She/her

## 2023-05-06 ENCOUNTER — LAB (OUTPATIENT)
Dept: LAB | Facility: CLINIC | Age: 58
End: 2023-05-06
Payer: COMMERCIAL

## 2023-05-06 DIAGNOSIS — E11.9 CONTROLLED TYPE 2 DIABETES MELLITUS WITHOUT COMPLICATION, WITHOUT LONG-TERM CURRENT USE OF INSULIN (H): ICD-10-CM

## 2023-05-06 DIAGNOSIS — Z12.5 SCREENING FOR PROSTATE CANCER: ICD-10-CM

## 2023-05-06 LAB
ALBUMIN SERPL BCG-MCNC: 3.9 G/DL (ref 3.5–5.2)
ALP SERPL-CCNC: 63 U/L
ALT SERPL W P-5'-P-CCNC: 23 U/L
ANION GAP SERPL CALCULATED.3IONS-SCNC: 2 MMOL/L (ref 7–15)
AST SERPL W P-5'-P-CCNC: 33 U/L
BILIRUB SERPL-MCNC: 0.5 MG/DL
BUN SERPL-MCNC: 10 MG/DL (ref 6–20)
CALCIUM SERPL-MCNC: 9.5 MG/DL
CHLORIDE SERPL-SCNC: 104 MMOL/L (ref 98–107)
CREAT SERPL-MCNC: 1.2 MG/DL
DEPRECATED HCO3 PLAS-SCNC: 28 MMOL/L (ref 22–29)
GFR SERPL CREATININE-BSD FRML MDRD: 71 ML/MIN/1.73M2
GLUCOSE SERPL-MCNC: 104 MG/DL (ref 70–99)
HBA1C MFR BLD: 6.9 % (ref 0–5.6)
POTASSIUM SERPL-SCNC: 4.7 MMOL/L (ref 3.4–5.3)
PROT SERPL-MCNC: 7.2 G/DL
PSA SERPL DL<=0.01 NG/ML-MCNC: 0.75 NG/ML (ref 0–3.5)
SODIUM SERPL-SCNC: 134 MMOL/L

## 2023-05-06 PROCEDURE — G0103 PSA SCREENING: HCPCS

## 2023-05-06 PROCEDURE — 80053 COMPREHEN METABOLIC PANEL: CPT

## 2023-05-06 PROCEDURE — 83036 HEMOGLOBIN GLYCOSYLATED A1C: CPT

## 2023-05-06 PROCEDURE — 36415 COLL VENOUS BLD VENIPUNCTURE: CPT

## 2023-05-11 SDOH — ECONOMIC STABILITY: FOOD INSECURITY: WITHIN THE PAST 12 MONTHS, YOU WORRIED THAT YOUR FOOD WOULD RUN OUT BEFORE YOU GOT MONEY TO BUY MORE.: NEVER TRUE

## 2023-05-11 SDOH — ECONOMIC STABILITY: INCOME INSECURITY: HOW HARD IS IT FOR YOU TO PAY FOR THE VERY BASICS LIKE FOOD, HOUSING, MEDICAL CARE, AND HEATING?: NOT HARD AT ALL

## 2023-05-11 SDOH — ECONOMIC STABILITY: TRANSPORTATION INSECURITY
IN THE PAST 12 MONTHS, HAS LACK OF TRANSPORTATION KEPT YOU FROM MEETINGS, WORK, OR FROM GETTING THINGS NEEDED FOR DAILY LIVING?: NO

## 2023-05-11 SDOH — ECONOMIC STABILITY: TRANSPORTATION INSECURITY
IN THE PAST 12 MONTHS, HAS THE LACK OF TRANSPORTATION KEPT YOU FROM MEDICAL APPOINTMENTS OR FROM GETTING MEDICATIONS?: NO

## 2023-05-11 SDOH — ECONOMIC STABILITY: FOOD INSECURITY: WITHIN THE PAST 12 MONTHS, THE FOOD YOU BOUGHT JUST DIDN'T LAST AND YOU DIDN'T HAVE MONEY TO GET MORE.: NEVER TRUE

## 2023-05-11 SDOH — HEALTH STABILITY: PHYSICAL HEALTH: ON AVERAGE, HOW MANY DAYS PER WEEK DO YOU ENGAGE IN MODERATE TO STRENUOUS EXERCISE (LIKE A BRISK WALK)?: 0 DAYS

## 2023-05-11 SDOH — HEALTH STABILITY: PHYSICAL HEALTH: ON AVERAGE, HOW MANY MINUTES DO YOU ENGAGE IN EXERCISE AT THIS LEVEL?: 0 MIN

## 2023-05-11 SDOH — ECONOMIC STABILITY: INCOME INSECURITY: IN THE LAST 12 MONTHS, WAS THERE A TIME WHEN YOU WERE NOT ABLE TO PAY THE MORTGAGE OR RENT ON TIME?: NO

## 2023-05-11 ASSESSMENT — LIFESTYLE VARIABLES
AUDIT-C TOTAL SCORE: 0
HOW OFTEN DO YOU HAVE A DRINK CONTAINING ALCOHOL: NEVER
SKIP TO QUESTIONS 9-10: 1
HOW MANY STANDARD DRINKS CONTAINING ALCOHOL DO YOU HAVE ON A TYPICAL DAY: PATIENT DOES NOT DRINK
HOW OFTEN DO YOU HAVE SIX OR MORE DRINKS ON ONE OCCASION: NEVER

## 2023-05-11 ASSESSMENT — ENCOUNTER SYMPTOMS
ARTHRALGIAS: 0
NERVOUS/ANXIOUS: 0
WEAKNESS: 0
HEADACHES: 0
HEMATOCHEZIA: 0
EYE PAIN: 0
PALPITATIONS: 0
HEMATURIA: 0
JOINT SWELLING: 0
COUGH: 0
SORE THROAT: 0
FREQUENCY: 0
FEVER: 0
SHORTNESS OF BREATH: 0
MYALGIAS: 0
DYSURIA: 0
DIZZINESS: 0
PARESTHESIAS: 0
DIARRHEA: 0
ABDOMINAL PAIN: 0
NAUSEA: 0
CONSTIPATION: 0
HEARTBURN: 0
CHILLS: 0

## 2023-05-11 ASSESSMENT — SOCIAL DETERMINANTS OF HEALTH (SDOH)
HOW OFTEN DO YOU GET TOGETHER WITH FRIENDS OR RELATIVES?: NEVER
HOW OFTEN DO YOU ATTEND CHURCH OR RELIGIOUS SERVICES?: MORE THAN 4 TIMES PER YEAR
DO YOU BELONG TO ANY CLUBS OR ORGANIZATIONS SUCH AS CHURCH GROUPS UNIONS, FRATERNAL OR ATHLETIC GROUPS, OR SCHOOL GROUPS?: YES
IN A TYPICAL WEEK, HOW MANY TIMES DO YOU TALK ON THE PHONE WITH FAMILY, FRIENDS, OR NEIGHBORS?: ONCE A WEEK

## 2023-05-12 ENCOUNTER — ALLIED HEALTH/NURSE VISIT (OUTPATIENT)
Dept: EDUCATION SERVICES | Facility: CLINIC | Age: 58
End: 2023-05-12
Payer: COMMERCIAL

## 2023-05-12 ENCOUNTER — LAB (OUTPATIENT)
Dept: PEDIATRICS | Facility: CLINIC | Age: 58
End: 2023-05-12

## 2023-05-12 ENCOUNTER — OFFICE VISIT (OUTPATIENT)
Dept: PEDIATRICS | Facility: CLINIC | Age: 58
End: 2023-05-12
Payer: COMMERCIAL

## 2023-05-12 VITALS
TEMPERATURE: 96.7 F | HEIGHT: 70 IN | OXYGEN SATURATION: 98 % | RESPIRATION RATE: 16 BRPM | BODY MASS INDEX: 40.21 KG/M2 | DIASTOLIC BLOOD PRESSURE: 58 MMHG | SYSTOLIC BLOOD PRESSURE: 121 MMHG | HEART RATE: 75 BPM | WEIGHT: 280.9 LBS

## 2023-05-12 DIAGNOSIS — Z12.11 SCREEN FOR COLON CANCER: ICD-10-CM

## 2023-05-12 DIAGNOSIS — Z00.00 ROUTINE GENERAL MEDICAL EXAMINATION AT A HEALTH CARE FACILITY: Primary | ICD-10-CM

## 2023-05-12 DIAGNOSIS — E11.9 CONTROLLED TYPE 2 DIABETES MELLITUS WITHOUT COMPLICATION, WITHOUT LONG-TERM CURRENT USE OF INSULIN (H): Primary | ICD-10-CM

## 2023-05-12 DIAGNOSIS — I10 ESSENTIAL HYPERTENSION: ICD-10-CM

## 2023-05-12 DIAGNOSIS — Z23 NEED FOR DIPHTHERIA-TETANUS-PERTUSSIS (TDAP) VACCINE: ICD-10-CM

## 2023-05-12 DIAGNOSIS — E11.9 CONTROLLED TYPE 2 DIABETES MELLITUS WITHOUT COMPLICATION, WITHOUT LONG-TERM CURRENT USE OF INSULIN (H): ICD-10-CM

## 2023-05-12 PROCEDURE — 90715 TDAP VACCINE 7 YRS/> IM: CPT | Performed by: STUDENT IN AN ORGANIZED HEALTH CARE EDUCATION/TRAINING PROGRAM

## 2023-05-12 PROCEDURE — 99396 PREV VISIT EST AGE 40-64: CPT | Mod: 25 | Performed by: STUDENT IN AN ORGANIZED HEALTH CARE EDUCATION/TRAINING PROGRAM

## 2023-05-12 PROCEDURE — 99207 PR NO CHARGE NURSE ONLY: CPT

## 2023-05-12 PROCEDURE — 90471 IMMUNIZATION ADMIN: CPT | Performed by: STUDENT IN AN ORGANIZED HEALTH CARE EDUCATION/TRAINING PROGRAM

## 2023-05-12 ASSESSMENT — ENCOUNTER SYMPTOMS
HEMATURIA: 0
MYALGIAS: 0
JOINT SWELLING: 0
DIARRHEA: 0
SORE THROAT: 0
PARESTHESIAS: 0
WEAKNESS: 0
NERVOUS/ANXIOUS: 0
ARTHRALGIAS: 0
ABDOMINAL PAIN: 0
CONSTIPATION: 0
HEARTBURN: 0
NAUSEA: 0
DYSURIA: 0
DIZZINESS: 0
PALPITATIONS: 0
COUGH: 0
SHORTNESS OF BREATH: 0
HEMATOCHEZIA: 0
FREQUENCY: 0
FEVER: 0
CHILLS: 0
HEADACHES: 0
EYE PAIN: 0

## 2023-05-12 ASSESSMENT — PAIN SCALES - GENERAL: PAINLEVEL: NO PAIN (0)

## 2023-05-12 NOTE — LETTER
5/12/2023         RE: Enrike Daniel  [address]  [place] [address]        Dear Colleague,    Thank you for referring your patient, Enrike Daniel, to the [facility]. Please see a copy of my visit note below.    Diabetes Self-Management Education & Support  Professional Continuous Glucose Monitor Insertion    SUBJECTIVE/OBJECTIVE:     Enrike Daniel presents for professional Continuous Glucose Monitor Insertion.     Patient comments/concerns:  None at this time.     Lab Results:  Lab Results   Component Value Date    A1C 6.9 05/06/2023      Lab Results   Component Value Date     05/06/2023     01/21/2022       Medication:  Diabetes Medication(s)     Biguanides       metFORMIN (GLUCOPHAGE) 1000 MG tablet    Take 1 tablet (1,000 mg) by mouth 2 times daily (with meals)    Incretin Mimetic Agents       Semaglutide, 1 MG/DOSE, (OZEMPIC) 4 MG/3ML pen    Inject 1 mg Subcutaneous every 7 days          ASSESSMENT:    CGM study indicated for: fluctuations in bg levels.     INTERVENTION:   Cyndy Pro sensor started today. Serial # [id number]. Exp 07/31/23  Sensor was inserted in to back of left arm with no resistance or bleeding at insertion site.      Pt will plan to wear the sensor through 05/26/23.    WRITTEN AND VERBAL INFORMATION GIVEN TO SUPPORT UNDERSTANDING OF:  LibrePro CGM: Sensor insertion, intention of monitoring for 14 days. Keep records of BG, food intake, exercise, and medication dosing during wear.       Patient verbalizes understanding of how to remove sensor, if needed, and all instructions provided.     Educational and other materials:  Food/exercise/medication log sheets  Contact information    PLAN:  Pt was given instructions for tracking BG, medications, food intake and activity.  Patient to return all items associated with the professional Continuous Glucose Monitor System.    See Patient Instructions, AVS printed and provided to patient  today.    Follow-up:    Follow up on 05/26/23.    Time spent in DSMT: 5 minutes   Time spent in CGM insertion: 5 minutes, in addition to time spent in DSMT  Encounter Type: Individual    Dominique Donohue RN, BSN, [facility]   Certified Diabetes Care &   [facility]

## 2023-05-12 NOTE — PROGRESS NOTES
SUBJECTIVE:   CC: nErike is an 57 year old who presents for preventative health visit.       5/12/2023     4:17 PM   Additional Questions   Roomed by [facility]   Accompanied by self         5/12/2023     4:17 PM   Patient Reported Additional Medications   Patient reports taking the following new medications none     Patient has been advised of split billing requirements and indicates understanding: No  Healthy Habits:     Getting at least 3 servings of Calcium per day:  Yes    Bi-annual eye exam:  Yes    Dental care twice a year:  Yes    Sleep apnea or symptoms of sleep apnea:  Sleep apnea    Diet:  Diabetic    Frequency of exercise:  None    Taking medications regularly:  Yes    Medication side effects:  None    PHQ-2 Total Score: 0    Additional concerns today:  No      CGM placed this morning  Has follow up in 2 weeks   No issues with olmesartan switch  Today's PHQ-2 Score:       5/11/2023     2:37 PM   PHQ-2 ( 1999 [employer])   Q1: Little interest or pleasure in doing things 0   Q2: Feeling down, depressed or hopeless 0   PHQ-2 Score 0   Q1: Little interest or pleasure in doing things Not at all   Q2: Feeling down, depressed or hopeless Not at all   PHQ-2 Score 0       Have you ever done Advance Care Planning? (For example, a Health Directive, POLST, or a discussion with a medical provider or your loved ones about your wishes): No, advance care planning information given to patient to review.  Patient declined advance care planning discussion at this time.    Social History     Tobacco Use     Smoking status: Never     Smokeless tobacco: Never   Vaping Use     Vaping status: Never Used   Substance Use Topics     Alcohol use: Not Currently             5/11/2023     2:37 PM   Alcohol Use   Prescreen: >3 drinks/day or >7 drinks/week? No       Last PSA:   Prostate Specific Antigen Screen   Date Value Ref Range Status   05/06/2023 0.75 0.00 - 3.50 ng/mL Final   08/07/2020 0.9 0.0 - 3.5 ng/mL Final       Reviewed orders with  "patient. Reviewed health maintenance and updated orders accordingly - Yes  Labs reviewed in EPIC    Reviewed and updated as needed this visit by clinical staff   Tobacco  Allergies  Meds              Reviewed and updated as needed this visit by Provider                 Review of Systems   Constitutional: Negative for chills and fever.   HENT: Negative for congestion, ear pain, hearing loss and sore throat.    Eyes: Negative for pain and visual disturbance.   Respiratory: Negative for cough and shortness of breath.    Cardiovascular: Negative for chest pain, palpitations and peripheral edema.   Gastrointestinal: Negative for abdominal pain, constipation, diarrhea, heartburn, hematochezia and nausea.   Genitourinary: Negative for dysuria, frequency, genital sores, hematuria, penile discharge and urgency.   Musculoskeletal: Negative for arthralgias, joint swelling and myalgias.   Skin: Negative for rash.   Neurological: Negative for dizziness, weakness, headaches and paresthesias.   Psychiatric/Behavioral: Negative for mood changes. The patient is not nervous/anxious.      OBJECTIVE:   /58   Pulse 75   Temp (!) 96.7  F (35.9  C) (Temporal)   Resp 16   Ht 1.775 m (5' 9.88\")   Wt 127.4 kg (280 lb 14.4 oz)   SpO2 98%   BMI 40.44 kg/m      Physical Exam  GENERAL: healthy, alert and no distress  EYES: Eyes grossly normal to inspection, and conjunctivae and sclerae normal  HENT: ear canals and TM's normal,  NECK: no adenopathy, no asymmetry, masses, or scars and thyroid normal to palpation  RESP: lungs clear to auscultation - no rales, rhonchi or wheezes  CV: regular rate and rhythm, normal S1 S2, no S3 or S4, no murmur, click or rub, no peripheral edema and peripheral pulses strong  ABDOMEN: soft, nontender, no hepatosplenomegaly, no masses   MS: no gross musculoskeletal defects noted, no edema  SKIN: no suspicious lesions or rashes  NEURO: Normal strength and tone, mentation intact and speech normal  PSYCH: " mentation appears normal, affect normal/bright  Diabetic foot exam: normal DP and PT pulses, no trophic changes or ulcerative lesions, normal sensory exam, normal monofilament exam and normal monofilament exam, except for findings noted on diabetic foot graphical image.  -not sensed at 1, 4, 9 bilaterally             ASSESSMENT/PLAN:       ICD-10-CM    1. Routine general medical examination at a health care facility  Z00.00       2. Screen for colon cancer  Z12.11 COLOGUARD(EXACT SCIENCES)      3. Need for diphtheria-tetanus-pertussis (Tdap) vaccine  Z23 TDAP VACCINE (Adacel, Boostrix)  [[id number]]      4. Essential hypertension  I10       5. Controlled type 2 diabetes mellitus without complication, without long-term current use of insulin (H)  E11.9         4. Improved and at goal on olmesartan.  5. Continue metformin, ozmepic 1mg.    COUNSELING:   Reviewed preventive health counseling, as reflected in patient instructions        He reports that he has never smoked. He has never used smokeless tobacco.            Bernadette Lott MD  [facility]

## 2023-05-12 NOTE — PROGRESS NOTES
Diabetes Self-Management Education & Support  Professional Continuous Glucose Monitor Insertion    SUBJECTIVE/OBJECTIVE:     Enrike Daniel presents for professional Continuous Glucose Monitor Insertion.     Patient comments/concerns:  None at this time.     Lab Results:  Lab Results   Component Value Date    A1C 6.9 05/06/2023      Lab Results   Component Value Date     05/06/2023     01/21/2022       Medication:  Diabetes Medication(s)     Biguanides       metFORMIN (GLUCOPHAGE) 1000 MG tablet    Take 1 tablet (1,000 mg) by mouth 2 times daily (with meals)    Incretin Mimetic Agents       Semaglutide, 1 MG/DOSE, (OZEMPIC) 4 MG/3ML pen    Inject 1 mg Subcutaneous every 7 days          ASSESSMENT:    CGM study indicated for: fluctuations in bg levels.     INTERVENTION:   Cyndy Pro sensor started today. Serial # [id number]. Exp 07/31/23  Sensor was inserted in to back of left arm with no resistance or bleeding at insertion site.      Pt will plan to wear the sensor through 05/26/23.    WRITTEN AND VERBAL INFORMATION GIVEN TO SUPPORT UNDERSTANDING OF:  LibrePro CGM: Sensor insertion, intention of monitoring for 14 days. Keep records of BG, food intake, exercise, and medication dosing during wear.       Patient verbalizes understanding of how to remove sensor, if needed, and all instructions provided.     Educational and other materials:  Food/exercise/medication log sheets  Contact information    PLAN:  Pt was given instructions for tracking BG, medications, food intake and activity.  Patient to return all items associated with the professional Continuous Glucose Monitor System.    See Patient Instructions, AVS printed and provided to patient today.    Follow-up:    Follow up on 05/26/23.    Time spent in DSMT: 5 minutes   Time spent in CGM insertion: 5 minutes, in addition to time spent in DSMT  Encounter Type: Individual    Dominique Donohue, RN, BSN, CDCES   Certified Diabetes Care &   [facility]  [facility]

## 2023-05-12 NOTE — PATIENT INSTRUCTIONS
Record food intake, activity, bg readings on log sheets provided.   Bring log sheets to f/u appt.     Do not remove the sensor for any reason. Sensor is waterproof.       Dominique Donohue RN, BSN, [facility]   Certified Diabetes Care &   [facility]

## 2023-05-26 ENCOUNTER — MYC MEDICAL ADVICE (OUTPATIENT)
Dept: PEDIATRICS | Facility: CLINIC | Age: 58
End: 2023-05-26

## 2023-05-26 ENCOUNTER — ALLIED HEALTH/NURSE VISIT (OUTPATIENT)
Dept: EDUCATION SERVICES | Facility: CLINIC | Age: 58
End: 2023-05-26
Payer: COMMERCIAL

## 2023-05-26 DIAGNOSIS — G47.33 OBSTRUCTIVE SLEEP APNEA SYNDROME: Primary | ICD-10-CM

## 2023-05-26 DIAGNOSIS — E11.9 CONTROLLED TYPE 2 DIABETES MELLITUS WITHOUT COMPLICATION, WITHOUT LONG-TERM CURRENT USE OF INSULIN (H): Primary | ICD-10-CM

## 2023-05-26 PROCEDURE — G0108 DIAB MANAGE TRN  PER INDIV: HCPCS | Mod: AE

## 2023-05-26 NOTE — LETTER
2023         RE: Enrike Daniel  [address]  [place] [address]        Dear Colleague,    Thank you for referring your patient, Enrike Daniel, to the [facility]. Please see a copy of my visit note below.    Diabetes Self-Management Education & Support    Presents for:      Type of Service: In Person Visit    Assessment Type:   REPORTS:  Pt returns today for review of LibrePro Continuous glucose study. Wore sensor for 14 days. Pt forgot to bring food log as requested. He did state that since he travels 3-5 days during the work week his diet is not healthy as he eats fast food or resautant food most of the time.     The study was done due to fluctuating bg levels. Most recent A1c: 6.9%.     Per Study:   Ave B, which corresponds to a GMI of 6.3%.   81% reading are in target. 12% above target. 5% below target.     Current Medications:: Ozempic: 1mg once a week. Metformin: 1000mg twice a day.     ---------------------------------------------------------------------------------------------------------------------------------------------------------                        Study results:  - Frequent post meal hyperglyecmia, some spikes more significant than others.   --- Pt feels this is do to amount and type of food he is eating as well as occasionally having a sugary beverage with a meal. Likes to drink Gatorade.   --- Pt feels hypereglycmia in the middle of the night is caused by two things: 1. Occasionally drinking a sugary beverage and poor sleep patterns. Pt has sleep apnea. Uses a CPAP machine but feels it does not work well. Wakes up several times in the middle of the night due to machine not alleviating problem.     - Several episodes of hypoglycemia noted without consistent pattern.   --- Pt feels some of these episodes may be due to going too long without eating or drinking a sugary beverage (Gatorade) then blood sugar falling fairly quickly after  "that.       PLAN  - Contact Pcp to request referral for sleep study. Pt thinks last study was done 7-8 years ago. Pt may benefit from using a different type of CPAP machine.  - Pack lunch and snacks when on the road as often as possible. Prepare sandwich(s), sliced vegetables, fruit, instead of stopping for fast food.   - Switch to Gatorade 2 (G-2) which is sugar free.   - When eating at home, reduce portion size of meals    Pt states he found Sensor Study to be very helpful and insightful.    Pt verbalized understanding of concepts discussed and recommendations provided today.       Topics to cover at upcoming visits: Healthy Eating and Being Active    Follow-up: Pt to f/u with DEANNA in 3 months. To have sleep study done prior to seeing DEANNA.        Vitals:  There were no vitals taken for this visit.  Estimated body mass index is 40.44 kg/m  as calculated from the following:    Height as of 5/12/23: 1.775 m (5' 9.88\").    Weight as of 5/12/23: 127.4 kg (280 lb 14.4 oz).   Last 3 BP:   BP Readings from Last 3 Encounters:   05/12/23 121/58   02/24/23 122/74   02/20/23 (!) 149/87       History   Smoking Status     Never   Smokeless Tobacco     Never       Labs:  Lab Results   Component Value Date    A1C 6.9 05/06/2023     Lab Results   Component Value Date     05/06/2023     01/21/2022     Lab Results   Component Value Date    LDL 82 02/11/2023     Direct Measure HDL   Date Value Ref Range Status   02/11/2023 33 (L) >=40 mg/dL Final   ]  GFR Estimate   Date Value Ref Range Status   05/06/2023 71 >60 mL/min/1.73m2 Final     Comment:     eGFR calculated using 2021 CKD-EPI equation.   05/15/2021 >60 >60 mL/min/1.73m2 Final     GFR Estimate If Black   Date Value Ref Range Status   05/15/2021 >60 >60 mL/min/1.73m2 Final     Lab Results   Component Value Date    CR 1.20 05/06/2023     No results found for: MICROALBUMIN    Taking Medications:  Diabetes Medication(s)     Biguanides       metFORMIN (GLUCOPHAGE) " 1000 MG tablet    Take 1 tablet (1,000 mg) by mouth 2 times daily (with meals)    Incretin Mimetic Agents       Semaglutide, 1 MG/DOSE, (OZEMPIC) 4 MG/3ML pen    Inject 1 mg Subcutaneous every 7 days           Patient Activation Measure Survey Score:      2/10/2023     9:56 AM   JILLIAN Score (Last Two)   JILLIAN Raw Score 39   Activation Score 90.2   JILLIAN Level 4       Time Spent:45} minutes  Encounter Type: Individual    Any diabetes medication dose changes were made via the CDE Protocol per the patient's primary care provider. A copy of this encounter was shared with the provider.    Dominique Donohue, RN, BSN, [facility]   Certified Diabetes Care &   [facility]

## 2023-05-26 NOTE — PROGRESS NOTES
Diabetes Self-Management Education & Support    Presents for:      Type of Service: In Person Visit    Assessment Type:   REPORTS:  Pt returns today for review of ContaAzulro Continuous glucose study. Wore sensor for 14 days. Pt forgot to bring food log as requested. He did state that since he travels 3-5 days during the work week his diet is not healthy as he eats fast food or resautant food most of the time.     The study was done due to fluctuating bg levels. Most recent A1c: 6.9%.     Per Study:   Ave B, which corresponds to a GMI of 6.3%.   81% reading are in target. 12% above target. 5% below target.     Current Medications:: Ozempic: 1mg once a week. Metformin: 1000mg twice a day.     ---------------------------------------------------------------------------------------------------------------------------------------------------------                        Study results:  - Frequent post meal hyperglyecmia, some spikes more significant than others.   --- Pt feels this is do to amount and type of food he is eating as well as occasionally having a sugary beverage with a meal. Likes to drink Gatorade.   --- Pt feels hypereglycmia in the middle of the night is caused by two things: 1. Occasionally drinking a sugary beverage and poor sleep patterns. Pt has sleep apnea. Uses a CPAP machine but feels it does not work well. Wakes up several times in the middle of the night due to machine not alleviating problem.     - Several episodes of hypoglycemia noted without consistent pattern.   --- Pt feels some of these episodes may be due to going too long without eating or drinking a sugary beverage (Gatorade) then blood sugar falling fairly quickly after that.       PLAN  - Contact Pcp to request referral for sleep study. Pt thinks last study was done 7-8 years ago. Pt may benefit from using a different type of CPAP machine.  - Pack lunch and snacks when on the road as often as possible. Prepare sandwich(s), sliced  "vegetables, fruit, instead of stopping for fast food.   - Switch to Gatorade 2 (G-2) which is sugar free.   - When eating at home, reduce portion size of meals    Pt states he found Sensor Study to be very helpful and insightful.    Pt verbalized understanding of concepts discussed and recommendations provided today.       Topics to cover at upcoming visits: Healthy Eating and Being Active    Follow-up: Pt to f/u with DEANNA in 3 months. [facility]        Vitals:  There were no vitals taken for this visit.  Estimated body mass index is 40.44 kg/m  as calculated from the following:    Height as of 5/12/23: 1.775 m (5' 9.88\").    Weight as of 5/12/23: 127.4 kg (280 lb 14.4 oz).   Last 3 BP:   BP Readings from Last 3 Encounters:   05/12/23 121/58   02/24/23 122/74   02/20/23 (!) 149/87       History   Smoking Status     Never   Smokeless Tobacco     Never       Labs:  Lab Results   Component Value Date    A1C 6.9 05/06/2023     Lab Results   Component Value Date     05/06/2023     01/21/2022     Lab Results   Component Value Date    LDL 82 02/11/2023     Direct Measure HDL   Date Value Ref Range Status   02/11/2023 33 (L) >=40 mg/dL Final   ]  GFR Estimate   Date Value Ref Range Status   05/06/2023 71 >60 mL/min/1.73m2 Final     Comment:     eGFR calculated using 2021 CKD-EPI equation.   05/15/2021 >60 >60 mL/min/1.73m2 Final     GFR Estimate If Black   Date Value Ref Range Status   05/15/2021 >60 >60 mL/min/1.73m2 Final     Lab Results   Component Value Date    CR 1.20 05/06/2023     No results found for: MICROALBUMIN    Taking Medications:  Diabetes Medication(s)     Biguanides       metFORMIN (GLUCOPHAGE) 1000 MG tablet    Take 1 tablet (1,000 mg) by mouth 2 times daily (with meals)    Incretin Mimetic Agents       Semaglutide, 1 MG/DOSE, (OZEMPIC) 4 MG/3ML pen    Inject 1 mg Subcutaneous every 7 days           Patient Activation Measure Survey Score:      2/10/2023 "     9:56 AM   JILLIAN Score (Last Two)   JILLIAN Raw Score 39   Activation Score 90.2   JILLIAN Level 4       Time Spent:45} minutes  Encounter Type: Individual    Any diabetes medication dose changes were made via the CDE Protocol per the patient's primary care provider. A copy of this encounter was shared with the provider.    Dominique Donohue, RN, BSN, [facility]   Certified Diabetes Care &   [facility]

## 2023-05-26 NOTE — PATIENT INSTRUCTIONS
Schedule sleep study.  Replace Gatorade with G-2 which is sugar free  Pack lunhes, snacks when on the road.  When home, reduce portion size of meals.       Dominique Donohue, RN, BSN, [facility]   Certified Diabetes Care &   [facility]

## 2023-07-28 ENCOUNTER — ALLIED HEALTH/NURSE VISIT (OUTPATIENT)
Dept: EDUCATION SERVICES | Facility: CLINIC | Age: 58
End: 2023-07-28
Payer: COMMERCIAL

## 2023-07-28 DIAGNOSIS — E11.9 CONTROLLED TYPE 2 DIABETES MELLITUS WITHOUT COMPLICATION, WITHOUT LONG-TERM CURRENT USE OF INSULIN (H): Primary | ICD-10-CM

## 2023-07-28 PROCEDURE — 99207 PR DIABETES SELF MANAGEMENT: CPT | Mod: AE

## 2023-07-28 NOTE — LETTER
"    2023         RE: Enrike Daniel  [address]  [place] [address]        Dear Colleague,    Thank you for referring your patient, Enrike Daniel, to the [facility] [place]. Please see a copy of my visit note below.    Diabetes Self-Management Education & Support    Presents for:      Type of Service: In Person Visit    Assessment Type:   ASSESSMENT:  Pt returns to clinic for follow-up. Diagnosed with Type 2 Diabetes 10+ years ago. Met with [facility] on  and . Cyndy Pro study done on . Results reviewed on . Plans to keep sleep study appt in Oct. Brought meter to [facility]. Checks 1-2 times daily. Hx: Obesity, Htn. Had bariatric surgery, \"years ago\".      Has stopped drinking beverages with sugar. Has increased intake of vegetables. No longer gets fast food. Usually packs lunch and healthy snacks when on the road.     Weight: 2023: 290#  Current Weight: 273#  Has noticed a decrease in food cravings since starting Ozempic 5 months ago;     BG LO/28: Pre L: 68  : F: 95, Pre D: 77  : Pre D: 85, Post D: 100  : Post B: 100, Pre D: 108  [address]: 112      Patient's most recent   Lab Results   Component Value Date    A1C 6.9 2023     is not meeting goal of  6.5.    Diabetes knowledge and skills assessment:   Patient is knowledgeable in diabetes management concepts related to: Monitoring and Taking Medication    Continue education with the following diabetes management concepts: Being Active    Based on learning assessment above, most appropriate setting for further diabetes education would be: Individual setting.      PLAN  - Continue current medication doses and healthier eating patterns.     Topics to cover at upcoming visits: Pt chooses to not return for diabetes education. Feels all questions have have been answered..     Follow-up: N/a.    See Care Plan for co-developed, patient-state behavior change goals.  AVS provided for patient " "today.    Education Materials Provided:  No new materials provided today    Vitals:  There were no vitals taken for this visit.  Estimated body mass index is 40.44 kg/m  as calculated from the following:    Height as of 5/12/23: 1.775 m (5' 9.88\").    Weight as of 5/12/23: 127.4 kg (280 lb 14.4 oz).   Last 3 BP:   BP Readings from Last 3 Encounters:   05/12/23 121/58   02/24/23 122/74   02/20/23 (!) 149/87       History   Smoking Status     Never   Smokeless Tobacco     Never       Labs:  Lab Results   Component Value Date    A1C 6.9 05/06/2023     Lab Results   Component Value Date     05/06/2023     01/21/2022     Lab Results   Component Value Date    LDL 82 02/11/2023     Direct Measure HDL   Date Value Ref Range Status   02/11/2023 33 (L) >=40 mg/dL Final   ]  GFR Estimate   Date Value Ref Range Status   05/06/2023 71 >60 mL/min/1.73m2 Final     Comment:     eGFR calculated using 2021 CKD-EPI equation.   05/15/2021 >60 >60 mL/min/1.73m2 Final     GFR Estimate If Black   Date Value Ref Range Status   05/15/2021 >60 >60 mL/min/1.73m2 Final     Lab Results   Component Value Date    CR 1.20 05/06/2023     No results found for: MICROALBUMIN    Taking Medications:  Diabetes Medication(s)       Biguanides       metFORMIN (GLUCOPHAGE) 1000 MG tablet    Take 1 tablet (1,000 mg) by mouth 2 times daily (with meals)      Incretin Mimetic Agents       Semaglutide, 1 MG/DOSE, (OZEMPIC) 4 MG/3ML pen    Inject 1 mg Subcutaneous every 7 days             Patient Activation Measure Survey Score:      2/10/2023     9:56 AM   JILLIAN Score (Last Two)   JILLIAN Raw Score 39   Activation Score 90.2   JILLIAN Level 4       Time Spent: 20 minutes  Encounter Type: Individual    Any diabetes medication dose changes were made via the CDE Protocol per the patient's primary care provider. A copy of this encounter was shared with the provider.    Dominique Donohue, RN, BSN, [facility]   Certified Diabetes Care &   [facility]" [facility]

## 2023-07-28 NOTE — PATIENT INSTRUCTIONS
Continue current medication and healthy eating regimen.         Dominique Donohue, RN, BSN, [facility]   Certified Diabetes Care &   [facility]

## 2023-07-28 NOTE — PROGRESS NOTES
"Diabetes Self-Management Education & Support    Presents for:      Type of Service: In Person Visit    Assessment Type:   ASSESSMENT:  Pt returns to clinic for follow-up. Diagnosed with Type 2 Diabetes 10+ years ago. Met with DEANNA on  and . Cyndy Pro study done on . Results reviewed on . Plans to keep sleep study appt in Oct. Brought meter to [facility]. Checks 1-2 times daily. Hx: Obesity, Htn. Had bariatric surgery, \"years ago\".      Has stopped drinking beverages with sugar. Has increased intake of vegetables. No longer gets fast food. Usually packs lunch and healthy snacks when on the road.     Weight: 2023: 290#  Current Weight: 273#  Has noticed a decrease in food cravings since starting Ozempic 5 months ago;     BG LO/28: Pre L: 68  : F: 95, Pre D: 77  : Pre D: 85, Post D: 100  : Post B: 100, Pre D: 108  [address]: 112      Patient's most recent   Lab Results   Component Value Date    A1C 6.9 2023     is not meeting goal of  6.5.    Diabetes knowledge and skills assessment:   Patient is knowledgeable in diabetes management concepts related to: Monitoring and Taking Medication    Continue education with the following diabetes management concepts: Being Active    Based on learning assessment above, most appropriate setting for further diabetes education would be: Individual setting.      PLAN  - Continue current medication doses and healthier eating patterns.     Topics to cover at upcoming visits: Pt chooses to not return for diabetes education. Feels all questions have have been answered..     Follow-up: N/a.    See Care Plan for co-developed, patient-state behavior change goals.  AVS provided for patient today.    Education Materials Provided:  No new materials provided today    Vitals:  There were no vitals taken for this visit.  Estimated body mass index is 40.44 kg/m  as calculated from the following:    Height as of 23: 1.775 m (5' 9.88\").    Weight as of " 5/12/23: 127.4 kg (280 lb 14.4 oz).   Last 3 BP:   BP Readings from Last 3 Encounters:   05/12/23 121/58   02/24/23 122/74   02/20/23 (!) 149/87       History   Smoking Status    Never   Smokeless Tobacco    Never       Labs:  Lab Results   Component Value Date    A1C 6.9 05/06/2023     Lab Results   Component Value Date     05/06/2023     01/21/2022     Lab Results   Component Value Date    LDL 82 02/11/2023     Direct Measure HDL   Date Value Ref Range Status   02/11/2023 33 (L) >=40 mg/dL Final   ]  GFR Estimate   Date Value Ref Range Status   05/06/2023 71 >60 mL/min/1.73m2 Final     Comment:     eGFR calculated using 2021 CKD-EPI equation.   05/15/2021 >60 >60 mL/min/1.73m2 Final     GFR Estimate If Black   Date Value Ref Range Status   05/15/2021 >60 >60 mL/min/1.73m2 Final     Lab Results   Component Value Date    CR 1.20 05/06/2023     No results found for: MICROALBUMIN    Taking Medications:  Diabetes Medication(s)       Biguanides       metFORMIN (GLUCOPHAGE) 1000 MG tablet    Take 1 tablet (1,000 mg) by mouth 2 times daily (with meals)      Incretin Mimetic Agents       Semaglutide, 1 MG/DOSE, (OZEMPIC) 4 MG/3ML pen    Inject 1 mg Subcutaneous every 7 days             Patient Activation Measure Survey Score:      2/10/2023     9:56 AM   JILLIAN Score (Last Two)   JILLIAN Raw Score 39   Activation Score 90.2   JILLIAN Level 4       Time Spent: 20 minutes  Encounter Type: Individual    Any diabetes medication dose changes were made via the CDE Protocol per the patient's primary care provider. A copy of this encounter was shared with the provider.    Dominique Donohue, RN, BSN, [facility]   Certified Diabetes Care &   [facility]

## 2023-08-10 DIAGNOSIS — E66.01 MORBID OBESITY (H): ICD-10-CM

## 2023-08-10 DIAGNOSIS — E11.9 CONTROLLED TYPE 2 DIABETES MELLITUS WITHOUT COMPLICATION, WITHOUT LONG-TERM CURRENT USE OF INSULIN (H): ICD-10-CM

## 2023-08-30 ENCOUNTER — TELEPHONE (OUTPATIENT)
Dept: PEDIATRICS | Facility: CLINIC | Age: 58
End: 2023-08-30
Payer: COMMERCIAL

## 2023-08-30 NOTE — TELEPHONE ENCOUNTER
Reason for Call:  Appointment Request    Patient requesting this type of appt: Chronic Diease Management/Medication/Follow-Up    Requested provider:  Ludmila Beltran    Reason patient unable to be scheduled: Not within requested timeframe    When does patient want to be seen/preferred time: 3-7 days    Comments: Per pt call, his pcp wanted him to make a virtual telephone visit with Ludmila Smalls since his pcp will be out of the office    Could we send this information to you in [employer] or would you prefer to receive a phone call?:   Patient would prefer a phone call   Okay to leave a detailed message?: Yes at Cell number on file:    Telephone Information:   Mobile [phone]       Call taken on 8/30/2023 at 5:52 PM by TIERNEY CARROLL

## 2023-08-31 NOTE — TELEPHONE ENCOUNTER
Routing to MA/TC. Fine to see extender or PCP in the next 1-2 weeks, virtual visit ok.    Ludmila Cardenas MD  Internal Medicine-Pediatrics

## 2023-09-14 ENCOUNTER — MYC MEDICAL ADVICE (OUTPATIENT)
Dept: PEDIATRICS | Facility: CLINIC | Age: 58
End: 2023-09-14

## 2023-09-14 ENCOUNTER — VIRTUAL VISIT (OUTPATIENT)
Dept: PEDIATRICS | Facility: CLINIC | Age: 58
End: 2023-09-14
Payer: COMMERCIAL

## 2023-09-14 DIAGNOSIS — I10 ESSENTIAL HYPERTENSION: ICD-10-CM

## 2023-09-14 DIAGNOSIS — I10 ESSENTIAL HYPERTENSION: Primary | ICD-10-CM

## 2023-09-14 PROCEDURE — 99213 OFFICE O/P EST LOW 20 MIN: CPT | Mod: 95 | Performed by: STUDENT IN AN ORGANIZED HEALTH CARE EDUCATION/TRAINING PROGRAM

## 2023-09-14 RX ORDER — LISINOPRIL 20 MG/1
20 TABLET ORAL DAILY
Qty: 90 TABLET | Refills: 1 | Status: SHIPPED | OUTPATIENT
Start: 2023-09-14 | End: 2023-10-04

## 2023-09-14 NOTE — PROGRESS NOTES
Enrike is a 58 year old who is being evaluated via a billable telephone visit.      Distant Location (provider location):  On-site    Assessment & Plan     Essential hypertension  In shared decision making with patient will switch from olmesartan back to lisinopril due to systolics in 140-150s. Has diabetes mellitus so would keep on ACEi or ARB. I will [employer] message in 3 weeks to check in. If blood pressure not well controlled consider BID dosing vs increase to 40mg vs addition of empagliflozin. Consider BMP, A1c pending blood pressure control and addition or change in other medication. Discussed plan of care and reasons to return to clinic or present to the ED (emergency department). Patient and/or guardian in agreement with plan.  - lisinopril (ZESTRIL) 20 MG tablet; Take 1 tablet (20 mg) by mouth daily    Bernadette Lott MD  [facility] SUSANNE Calvert is a 58 year old, presenting for the following health issues:  No chief complaint on file.      History of Present Illness       Reason for visit:  High blood pressure    He eats 0-1 servings of fruits and vegetables daily.He consumes 0 sweetened beverage(s) daily.He exercises with enough effort to increase his heart rate 30 to 60 minutes per day.  He exercises with enough effort to increase his heart rate 5 days per week.   He is taking medications regularly.     Blood pressure  -switched to olmesartan has had fluctuation  -work is strenuous              Review of Systems         Objective           Vitals:  No vitals were obtained today due to virtual visit.    Physical Exam   healthy, alert, and no distress  PSYCH: Alert and oriented times 3; coherent speech, normal   rate and volume, able to articulate logical thoughts, able   to abstract reason, no tangential thoughts, no hallucinations   or delusions  His affect is normal  RESP: No cough, no audible wheezing, able to talk in full sentences  Remainder of exam unable to be completed  due to telephone visits                Phone call duration: 10 minutes

## 2023-09-16 ENCOUNTER — IMMUNIZATION (OUTPATIENT)
Dept: FAMILY MEDICINE | Facility: CLINIC | Age: 58
End: 2023-09-16
Payer: COMMERCIAL

## 2023-09-16 PROCEDURE — 90682 RIV4 VACC RECOMBINANT DNA IM: CPT

## 2023-09-16 PROCEDURE — 90471 IMMUNIZATION ADMIN: CPT

## 2023-10-02 ASSESSMENT — SLEEP AND FATIGUE QUESTIONNAIRES
HOW LIKELY ARE YOU TO NOD OFF OR FALL ASLEEP WHILE SITTING AND TALKING TO SOMEONE: SLIGHT CHANCE OF DOZING
HOW LIKELY ARE YOU TO NOD OFF OR FALL ASLEEP WHEN YOU ARE A PASSENGER IN A CAR FOR AN HOUR WITHOUT A BREAK: SLIGHT CHANCE OF DOZING
HOW LIKELY ARE YOU TO NOD OFF OR FALL ASLEEP WHILE LYING DOWN TO REST IN THE AFTERNOON WHEN CIRCUMSTANCES PERMIT: SLIGHT CHANCE OF DOZING
HOW LIKELY ARE YOU TO NOD OFF OR FALL ASLEEP WHILE WATCHING TV: SLIGHT CHANCE OF DOZING
HOW LIKELY ARE YOU TO NOD OFF OR FALL ASLEEP WHILE SITTING INACTIVE IN A PUBLIC PLACE: MODERATE CHANCE OF DOZING
HOW LIKELY ARE YOU TO NOD OFF OR FALL ASLEEP IN A CAR, WHILE STOPPED FOR A FEW MINUTES IN TRAFFIC: SLIGHT CHANCE OF DOZING
HOW LIKELY ARE YOU TO NOD OFF OR FALL ASLEEP WHILE SITTING QUIETLY AFTER LUNCH WITHOUT ALCOHOL: SLIGHT CHANCE OF DOZING
HOW LIKELY ARE YOU TO NOD OFF OR FALL ASLEEP WHILE SITTING AND READING: SLIGHT CHANCE OF DOZING

## 2023-10-03 NOTE — TELEPHONE ENCOUNTER
Would increase lisinopril to 20mg BID. Patient was previously on ARB but wanted to go back to medication.    Bernadette Lott MD  Internal Medicine & Pediatrics  [facility]  She/her

## 2023-10-04 RX ORDER — LISINOPRIL 20 MG/1
20 TABLET ORAL 2 TIMES DAILY
Qty: 180 TABLET | Refills: 1 | Status: SHIPPED | OUTPATIENT
Start: 2023-10-04 | End: 2024-06-14

## 2023-10-09 ENCOUNTER — OFFICE VISIT (OUTPATIENT)
Dept: SLEEP MEDICINE | Facility: CLINIC | Age: 58
End: 2023-10-09
Attending: STUDENT IN AN ORGANIZED HEALTH CARE EDUCATION/TRAINING PROGRAM
Payer: COMMERCIAL

## 2023-10-09 VITALS
SYSTOLIC BLOOD PRESSURE: 128 MMHG | HEIGHT: 70 IN | DIASTOLIC BLOOD PRESSURE: 65 MMHG | BODY MASS INDEX: 39.85 KG/M2 | HEART RATE: 77 BPM | WEIGHT: 278.38 LBS | OXYGEN SATURATION: 96 %

## 2023-10-09 DIAGNOSIS — G47.33 OBSTRUCTIVE SLEEP APNEA SYNDROME: ICD-10-CM

## 2023-10-09 PROCEDURE — 99205 OFFICE O/P NEW HI 60 MIN: CPT | Performed by: NURSE PRACTITIONER

## 2023-10-09 NOTE — PROGRESS NOTES
Outpatient Sleep Medicine Consultation:      Name: Enrike Daniel MRN# [id number]   Age: 58 year old YOB: 1965     Date of Consultation: October 9, 2023  Consultation is requested by: Bernadette Lott MD  [address] [place] [address] Bernadette Lott  Primary care provider: Bernadette Lott       Reason for Sleep Consult:     Enrike Daniel is sent by Bernadette Lott for a sleep consultation regarding obstructive sleep apnea.    Patient s Reason for visit  Enrike Daniel main reason for visit: Dont feel rested  Patient states problem(s) started: 6 months  Enrike Daniel's goals for this visit: Find way feel rested           Assessment and Plan:     Summary Sleep Diagnoses:  Severe obstructive sleep apnea  Fatigue  Excessive daytime sleepiness    Comorbid Diagnoses:  Class II obesity, BMI 39.9  Diabetes mellitus type 2, controlled without complication  Hyperlipidemia  Adjustment disorder  Psychosexual dysfunction  Erectile dysfunction  Status post bariatric surgery      Summary Recommendations:  Orders Placed This Encounter   Procedures    Comprehensive DME    Comprehensive Weight Management    Sleep Study - HIM Scan   1.  Recommend patient continue his diligent use of his CPAP therapy at same settings.  Patient should use his CPAP for longer periods of time   2.  Recommend patient return to clinic in approximately 3 weeks at his convenience for a download of his compliance statistics reflecting both efficacy as well as compliance.  This does not need to be a scheduled appointment.  3.  Recommend patient optimize sleep schedule as well as his sleep hygiene practices.  This will mitigate any future sleep intrusion.  4.  Recommend patient employ safe driving practices such as not driving a car if drowsy.  5.  Weight management to BMI of 30    Summary Counseling:    Sleep Testing Reviewed: Original sleep sleep study, contrasted with downloaded information from today's  compliance statistics  Obstructive Sleep Apnea Reviewed   -Discussed pathophysiology of obstructive sleep apnea as well as central sleep apnea  Complications of Untreated Sleep Apnea Reviewed in the context of his medical diagnoses      Medical Decision-making:   Educational materials provided in instructions    Total time spent reviewing medical records, history and physical examination, review of previous testing and interpretation as well as documentation on this date: 60 minutes    CC: Bernadette Lott          History of Present Illness:     Enrike Daniel is a very pleasant 58-year-old gentleman who presents to the sleep medicine clinic for an evaluation of efficacy and compliance.  His last visit of this nature was on 4/16/2021.    Patient needs to abide by DOT standards as he drives a truck delivering food products.  He has an irregular sleep/wake schedule and he admits that this may play a large part of his fatigue.  He denies any symptoms of sleepiness behind the wheel.  He does not have a bed partner.  He does not consume caffeine nor does he consume alcohol.     We discussed his diligent use of his CPAP, 29/30 days, however he is only meeting 60% compliance with more than 4 hours/day.  He discusses he has mask difficulties.  Currently he is using a nasal pillow style mask.  Suggested that perhaps he could entertain a dream wisp style mask.      He is concerned about hypertension and his daily fatigue.  We discussed that fatigue could be present for many reasons.  His irregular sleep schedule is likely a culprit, but also his weight, hypertension, diabetes, medications, behavioral and lifestyle choices also play a part.        Past Sleep Evaluations:    PSG split-night [facility] 6/27/2017  Weight 265 lbs, BMI 38  AHI 41, RDI 47, Lowest sat O2 76%  CPAP titrated to 8-9      [facility]  PAP download 9/9/2023-10/8/2023  Per cent of days used greater than 4 Hours 60.0% (minimum goal  greater than 70%)  Average use on days used: 4 hours 44 min  Settings: Auto titrate CPAP 15 cmH2O  Average AHI 4.1 events per hour (goal less than 5)    SLEEP-WAKE SCHEDULE:     Work/School Days: Patient goes to school/work: Yes   Usually gets into bed at [employer] due to work  Takes patient about Varies to fall asleep  Has trouble falling asleep 5 nights per week  Wakes up in the middle of the night Multiple times.  Wakes up due to Use the bathroom;Uncertain  He has trouble falling back asleep 5 times a week.   It usually takes Varies to get back to sleep  Patient is usually up at Varies due to work  Uses alarm: Yes    Weekends/Non-work Days/All Other Days:  Usually gets into bed at 9 pm   Takes patient about Varies to fall asleep  Patient is usually up at 8 am  Uses alarm: No    Sleep Need  Patient gets  3 hours sleep on average   Patient thinks he needs about 8 hours sleep    Enrike Daniel prefers to sleep in this position(s): Back;Side;Head Elevated   Patient states they do the following activities in bed:      Naps  Patient takes a purposeful nap 2 times a week and naps are usually 30 minutes in duration  He feels better after a nap: No  He dozes off unintentionally 0 days per week  Patient has had a driving accident or near-miss due to sleepiness/drowsiness: No      SLEEP DISRUPTIONS:    Breathing/Snoring  Patient snores:Yes  Other people complain about his snoring: No  Patient has been told he stops breathing in his sleep:Yes  He has issues with the following: Getting up to urinate more than once    Movement:  Patient gets pain, discomfort, with an urge to move:  No  It happens when he is resting:  No  It happens more at night:     Patient has been told he kicks his legs at night:  No     Behaviors in Sleep:  Enrike Daniel has experienced the following behaviors while sleeping:    He has experienced sudden muscle weakness during the day: No      Is there anything else you would like your sleep provider to  know:          CAFFEINE AND OTHER SUBSTANCES:    Patient consumes caffeinated beverages per day:  0  Last caffeine use is usually:    List of any prescribed or over the counter stimulants that patient takes:    List of any prescribed or over the counter sleep medication patient takes:    List of previous sleep medications that patient has tried:    Patient drinks alcohol to help them sleep: No  Patient drinks alcohol near bedtime: No    Family History:  Patient has a family member been diagnosed with a sleep disorder: No            SCALES:    EPWORTH SLEEPINESS SCALE         10/2/2023    10:01 AM    [place] Sleepiness Scale ( BIANKA Arvizu  [id number]<br>ESS - [place]/English - Final version - 21 Nov 07 - [facility] [place].)   Sitting and reading Slight chance of dozing   Watching TV Slight chance of dozing   Sitting, inactive in a public place (e.g. a theatre or a meeting) Moderate chance of dozing   As a passenger in a car for an hour without a break Slight chance of dozing   Lying down to rest in the afternoon when circumstances permit Slight chance of dozing   Sitting and talking to someone Slight chance of dozing   Sitting quietly after a lunch without alcohol Slight chance of dozing   In a car, while stopped for a few minutes in traffic Slight chance of dozing   [place] Score (MC) 9   [place] Score (Sleep) 9         INSOMNIA SEVERITY INDEX (ERENDIRA)          10/2/2023     9:44 AM   Insomnia Severity Index (ERENDIRA)   Difficulty falling asleep 2   Difficulty staying asleep 1   Problems waking up too early 3   How SATISFIED/DISSATISFIED are you with your CURRENT sleep pattern? 3   How NOTICEABLE to others do you think your sleep problem is in terms of impairing the quality of your life? 2   How WORRIED/DISTRESSED are you about your current sleep problem? 3   To what extent do you consider your sleep problem to INTERFERE with your daily functioning (e.g. daytime fatigue, mood, ability to function at work/daily chores,  "concentration, memory, mood, etc.) CURRENTLY? 2   ERENDIRA Total Score 16       Guidelines for Scoring/Interpretation:  Total score categories:  0-7 = No clinically significant insomnia   8-14 = Subthreshold insomnia   15-21 = Clinical insomnia (moderate severity)  22-28 = Clinical insomnia (severe)  Used via courtesy of www.[facility].va.gov with permission from Junito Dixon PhD., [facility]      STOP BANG         10/9/2023     7:27 AM   STOP BANG Questionnaire (  2008, the American Society of Anesthesiologists, Inc. [employer].)   Neck Cir (cm) Clinic: 50 cm   B/P Clinic: 128/65   BMI Clinic: 39.94         GAD7        3/12/2021     3:00 PM   MAHSA-7    1. Feeling nervous, anxious, or on edge 0   2. Not being able to stop or control worrying 0   3. Worrying too much about different things 0   4. Trouble relaxing 0   5. Being so restless that it is hard to sit still 0   6. Becoming easily annoyed or irritable 0   7. Feeling afraid, as if something awful might happen 0   MAHSA-7 Total Score 0   If you checked any problems, how difficult have they made it for you to do your work, take care of things at home, or get along with other people? Not difficult at all         CAGE-AID         No data to display                CAGE-AID reprinted with permission from the [place] Medical Journal, MARIO Kelly. and RILEY Spann, \"Conjoint screening questionnaires for alcohol and drug abuse\" [place] Medical Journal 94: 135-140, 1995.      PATIENT HEALTH QUESTIONNAIRE-9 (PHQ - 9)        8/30/2022     7:13 AM   PHQ-9 ([employer])   1.  Little interest or pleasure in doing things 0   2.  Feeling down, depressed, or hopeless 3   3.  Trouble falling or staying asleep, or sleeping too much 3   4.  Feeling tired or having little energy 2   5.  Poor appetite or overeating 3   6.  Feeling bad about yourself - or that you are a failure or have let yourself or your family down 0   7.  Trouble concentrating on things, such " as reading the newspaper or watching television 0   8.  Moving or speaking so slowly that other people could have noticed. Or the opposite - being so fidgety or restless that you have been moving around a lot more than usual 0   9.  Thoughts that you would be better off dead, or of hurting yourself in some way 0   PHQ-9 Total Score 11   6.  Feeling bad about yourself 0   7.  Trouble concentrating 0   8.  Moving slowly or restless 0   9.  Suicidal or self-harm thoughts 0   1.  Little interest or pleasure in doing things Not at all   2.  Feeling down, depressed, or hopeless Nearly every day   3.  Trouble falling or staying asleep, or sleeping too much Nearly every day   4.  Feeling tired or having little energy More than half the days   5.  Poor appetite or overeating Nearly every day   6.  Feeling bad about yourself Not at all   7.  Trouble concentrating Not at all   8.  Moving slowly or restless Not at all   9.  Suicidal or self-harm thoughts Not at all   PHQ-9 via [employer] TOTAL SCORE-----> 11 (Moderate depression)   Difficulty at work, home, or with people Very difficult       Developed by Sesar Crump, Nasra Armando, Sathya Solomon and colleagues, with an educational regis from [employer]. No permission required to reproduce, translate, display or distribute.        Allergies:    No Known Allergies    Medications:    Current Outpatient Medications   Medication Sig Dispense Refill    alcohol swab prep pads Use to swab area of injection/ramos as directed. 100 each 3    aspirin (ASA) 81 MG EC tablet Take 81 mg by mouth      blood glucose (NO BRAND SPECIFIED) test strip Use to test blood sugar 2 times daily or as directed. To accompany: Blood Glucose Monitor Brands: per insurance. 100 strip 6    blood glucose calibration (NO BRAND SPECIFIED) solution To accompany: Blood Glucose Monitor Brands: per insurance. 1 each 11    blood glucose monitoring (NO BRAND SPECIFIED) meter device kit Use to test blood  sugar 2 times daily or as directed. Preferred blood glucose meter OR supplies to accompany: Blood Glucose Monitor Brands: per insurance. 1 kit 0    cholecalciferol 50 MCG (2000 UT) tablet Take 1,000 Units by mouth      escitalopram (LEXAPRO) 10 MG tablet Take 1 tablet (10 mg) by mouth daily 90 tablet 3    lisinopril (ZESTRIL) 20 MG tablet Take 1 tablet (20 mg) by mouth 2 times daily 180 tablet 1    metFORMIN (GLUCOPHAGE) 1000 MG tablet Take 1 tablet (1,000 mg) by mouth 2 times daily (with meals) 180 tablet 3    Semaglutide, 1 MG/DOSE, (OZEMPIC) 4 MG/3ML pen Inject 1 mg Subcutaneous every 7 days 9 mL 1    simvastatin (ZOCOR) 40 MG tablet Take 1 tablet (40 mg) by mouth At Bedtime 90 tablet 3    thin (NO BRAND SPECIFIED) lancets Use with lanceting device. To accompany: Blood Glucose Monitor Brands: per insurance. 100 each 6       Problem List:  Patient Active Problem List    Diagnosis Date Noted    Morbid obesity (H) 04/16/2021     Priority: Medium    Psychosexual dysfunction with inhibited sexual excitement 07/07/2013     Priority: Medium    Erectile dysfunction 07/03/2013     Priority: Medium    Adjustment disorder with disturbance of conduct 10/19/2011     Priority: Medium     Formatting of this note might be different from the original.  Enrike had an anger outburst in a meeting about an accident he had with his   bus.  He was referred to EAP who referred him on for more comprehensive   treatment.  Formatting of this note might be different from the original.  Enrike had an anger outburst in a meeting about an accident he had with his   bus.  He was referred to EAP who referred him on for more comprehensive   treatment.      Status post bariatric surgery 09/27/2011     Priority: Medium     Formatting of this note might be different from the original.  Laparoscopic vertical sleeve gastrectomy  Formatting of this note might be different from the original.  Laparoscopic vertical sleeve gastrectomy      Esophageal reflux  03/23/2011     Priority: Medium     Formatting of this note might be different from the original.  Gastroesophageal Reflux Disease  Formatting of this note might be different from the original.  Gastroesophageal Reflux Disease      Controlled type 2 diabetes mellitus without complication, without long-term current use of insulin (H) 04/24/2008     Priority: Medium     Formatting of this note might be different from the original.  DM Type2  Formatting of this note might be different from the original.  DM Type2      Obstructive sleep apnea syndrome 04/28/2006     Priority: Medium     Formatting of this note might be different from the original.  Sleep Apnea  Formatting of this note might be different from the original.  Sleep Apnea      Hyperlipidemia 04/17/2006     Priority: Medium    Essential hypertension 04/12/2006     Priority: Medium     Formatting of this note might be different from the original.  Hypertension  Epic  Formatting of this note might be different from the original.  Hypertension  Formatting of this note might be different from the original.  Epic          Past Medical/Surgical History:  Past Medical History:   Diagnosis Date    Benign essential hypertension     Class 3 severe obesity due to excess calories with serious comorbidity and body mass index (BMI) of 40.0 to 44.9 in adult (H)     Depressive disorder 01/2021    NA    Diabetes mellitus (H)     Essential hypertension     JAH (obstructive sleep apnea)     Type 2 diabetes mellitus, without long-term current use of insulin (H)      Past Surgical History:   Procedure Laterality Date    ANTERIOR CRUCIATE LIGAMENT REPAIR      GASTRIC BYPASS      ORTHOPEDIC SURGERY  02/2014    Right Miscus repair       Social History:  Social History     Socioeconomic History    Marital status:      Spouse name: Not on file    Number of children: 0    Years of education: Not on file    Highest education level: Not on file   Occupational History    Not on  file   Tobacco Use    Smoking status: Never    Smokeless tobacco: Never   Vaping Use    Vaping Use: Never used   Substance and Sexual Activity    Alcohol use: Not Currently    Drug use: Never    Sexual activity: Not Currently     Birth control/protection: None   Other Topics Concern    Parent/sibling w/ CABG, MI or angioplasty before 65F 55M? No   Social History Narrative    Not on file     Social Determinants of Health     Financial Resource Strain: Low Risk  (5/11/2023)    Overall Financial Resource Strain (CARDIA)     Difficulty of Paying Living Expenses: Not hard at all   Food Insecurity: No Food Insecurity (5/11/2023)    Hunger Vital Sign     Worried About Running Out of Food in the Last Year: Never true     Ran Out of Food in the Last Year: Never true   Transportation Needs: No Transportation Needs (5/11/2023)    PRAPARE - Transportation     Lack of Transportation (Medical): No     Lack of Transportation (Non-Medical): No   Physical Activity: Inactive (5/11/2023)    Exercise Vital Sign     Days of Exercise per Week: 0 days     Minutes of Exercise per Session: 0 min   Stress: No Stress Concern Present (5/11/2023)    [place] [employer] - Occupational Stress Questionnaire     Feeling of Stress : Not at all   Social Connections: Moderately Isolated (5/11/2023)    Social Connection and Isolation Panel [NHANES]     Frequency of Communication with Friends and Family: Once a week     Frequency of Social Gatherings with Friends and Family: Never     Attends Scientology Services: More than 4 times per year     Active Member of Clubs or Organizations: Yes     Attends Club or Organization Meetings: Not on file     Marital Status:    Interpersonal Safety: Not on file   Housing Stability: Low Risk  (5/11/2023)    Housing Stability Vital Sign     Unable to Pay for Housing in the Last Year: No     Number of Places Lived in the Last Year: 1     Unstable Housing in the Last Year: No       Family  "History:  Family History   Problem Relation Age of Onset    Colon Cancer No family hx of     Prostate Cancer No family hx of     Coronary Artery Disease No family hx of     Cerebrovascular Disease No family hx of        Review of Systems:  A complete review of systems reviewed by me is negative with the exeption of what has been mentioned in the history of present illness.  In the last TWO WEEKS have you experienced any of the following symptoms?  Fevers: No  Night Sweats: No  Weight Gain: No  Pain at Night: No  Double Vision: No  Changes in Vision: No  Difficulty Breathing through Nose: No  Sore Throat in Morning: No  Dry Mouth in the Morning: No  Shortness of Breath Lying Flat: No  Shortness of Breath With Activity: No  Awakening with Shortness of Breath: No  Increased Cough: No  Heart Racing at Night: No  Swelling in Feet or Legs: No  Diarrhea at Night: No  Heartburn at Night: No  Urinating More than Once at Night: Yes  Losing Control of Urine at Night: No  Joint Pains at Night: No  Headaches in Morning: No  Weakness in Arms or Legs: No  Depressed Mood: Yes  Anxiety: No     Physical Examination:  Vitals: /65   Pulse 77   Ht 1.778 m (5' 10\")   Wt 126.3 kg (278 lb 6 oz)   SpO2 96%   BMI 39.94 kg/m    BMI= Body mass index is 39.94 kg/m .    Neck Cir (cm): 50 cm      Physical Exam  Vitals and nursing note reviewed.   Constitutional:       General: He is not in acute distress.     Appearance: Normal appearance. He is well-developed and well-groomed. He is obese. He is not ill-appearing, toxic-appearing or diaphoretic.   HENT:      Head: Normocephalic and atraumatic.      Right Ear: External ear normal.      Left Ear: External ear normal.      Nose: Nose normal.      Mouth/Throat:      Mouth: Mucous membranes are moist.      Pharynx: Oropharynx is clear.   Eyes:      Conjunctiva/sclera: Conjunctivae normal.   Pulmonary:      Effort: Pulmonary effort is normal.   Musculoskeletal:         General: Normal range " of motion.      Cervical back: Normal range of motion and neck supple.   Skin:     General: Skin is warm and dry.      Capillary Refill: Capillary refill takes less than 2 seconds.   Neurological:      General: No focal deficit present.      Mental Status: He is alert and oriented to person, place, and time.   Psychiatric:         Mood and Affect: Mood normal.         Behavior: Behavior normal. Behavior is cooperative.         Thought Content: Thought content normal.         Judgment: Judgment normal.                Data: All pertinent previous laboratory data reviewed     Recent Labs   Lab Test 05/06/23  0956 08/29/22  0835    135*   POTASSIUM 4.7 4.5   CHLORIDE 104 100   CO2 28 27   ANIONGAP 2* 8   * 110*   BUN 10.0 11.5   CR 1.20 1.16   ERNA 9.5 9.7       Recent Labs   Lab Test 02/11/23  1108   WBC 7.3   RBC 4.77   HGB 14.9   HCT 41.8   MCV 88   MCH 31.2   MCHC 35.6   RDW 12.1          Recent Labs   Lab Test 05/06/23  0956   PROTTOTAL 7.2   ALBUMIN 3.9   BILITOTAL 0.5   ALKPHOS 63   AST 33   ALT 23       TSH (uIU/mL)   Date Value   08/07/2020 1.03       No results found for: UAMP, UBARB, BENZODIAZEUR, UCANN, UCOC, OPIT, UPCP    Ferritin   Date/Time Value Ref Range Status   02/18/2021 12:53 PM 60 27 - 300 ng/mL Final       No results found for: PH, PHARTERIAL, PO2, [id number], SAT, PCO2, HCO3, BASEEXCESS, MU, BEB    @LABRCNTIPR(phv:4,pco2v:4,po2v:4,hco3v:4,cristina:4,o2per:4)@    Echocardiology: No results found for this or any previous visit (from the past 4320 hour(s)).    Chest x-ray:   XR Ribs & Chest Left G/E 3 Views 02/20/2023    Narrative  EXAM: XR RIBS AND CHEST LEFT 3 VIEWS  LOCATION: [facility]  DATE/TIME: 2/20/2023 7:55 PM    INDICATION: Fall, pain  COMPARISON: None.    Impression  IMPRESSION: No acute left rib fracture. Left lower ribs are suboptimally visualized. No pleural effusion. No pneumothorax. No infiltrate.      Chest CT: No results found for this or any  previous visit from the past 365 days.      PFT: Most Recent [facility] Pulmonary Function Testing    No results found for: 20001  No results found for: 20002  No results found for: 20003  No results found for: 20015  No results found for: 20016  No results found for: 20027  No results found for: 20028  No results found for: 20029  No results found for: 20079  No results found for: 20080  No results found for: 20081  No results found for: 20335  No results found for: 20105  No results found for: 20053  No results found for: 20054  No results found for: 20055      GISELL Mchugh CNP 10/9/2023   .Sleep Medicine    This note was written with the assistance of the Dragon voice-dictation technology software. The final document, although reviewed, may contain errors. For corrections, please contact the office.

## 2023-10-09 NOTE — NURSING NOTE
"Chief Complaint   Patient presents with    Consult     Establish care for CPAP       Initial /65   Pulse 77   Ht 1.778 m (5' 10\")   Wt 126.3 kg (278 lb 6 oz)   SpO2 96%   BMI 39.94 kg/m   Estimated body mass index is 39.94 kg/m  as calculated from the following:    Height as of this encounter: 1.778 m (5' 10\").    Weight as of this encounter: 126.3 kg (278 lb 6 oz).    Medication Reconciliation: complete    Neck circumference:  inches / 50 centimeters.    DME:     [facility]    "

## 2023-10-09 NOTE — PATIENT INSTRUCTIONS
Drive Safe... Drive Alive     Sleep health profoundly affects your health, mood, and your safety. 33% of the population (one in three of us) is not getting enough sleep and many have a sleep disorder. Not getting enough sleep or having an untreated / undertreated sleep condition may make us sleepy without even knowing it. In fact, our driving could be dramatically impaired due to our sleep health. As your provider, here are some things I would like you to know about driving:     Here are some warning signs for impairment and dangerous drowsy driving:              -Having been awake more than 16 hours               -Looking tired               -Eyelid drooping              -Head nodding (it could be too late at this point)              -Driving for more than 30 minutes     Some things you could do to make the driving safer if you are experiencing some drowsiness:              -Stop driving and rest              -Call for transportation              -Make sure your sleep disorder is adequately treated     Some things that have been shown NOT to work when experiencing drowsiness while driving:              -Turning on the radio              -Opening windows              -Eating any  distracting  /  entertaining  foods (e.g., sunflower seeds, candy, or any other)              -Talking on the phone      Your decision may not only impact your life, but also the life of others. Please, remember to drive safe for yourself and all of us. Your BMI is Body mass index is 39.94 kg/m .    What is BMI?  Body mass index (BMI) is one way to tell whether you are at a healthy weight, overweight, or obese. It measures your weight in relation to your height.  A BMI of 18.5 to 24.9 is in the healthy range. A person with a BMI of 25 to 29.9 is considered overweight, and someone with a BMI of 30 or greater is considered obese.  Another way to find out if you are at risk for health problems caused by overweight and obesity is to measure  your waist. If you are a woman and your waist is more than 35 inches, or if you are a man and your waist is more than 40 inches, your risk of disease may be higher.  More than two-thirds of American adults are considered overweight or obese. Being overweight or obese increases the risk for further weight gain.  Excess weight may lead to heart disease and diabetes. Creating and following plans for healthy eating and physical activity may help you improve your health.    Methods for maintaining or losing weight.  Weight control is part of healthy lifestyle and includes exercise, emotional health, and healthy eating habits.  Careful eating habits lifelong is the mainstay of weight control.  Though there are significant health benefits from weight loss, long-term weight loss with diet alone may be very difficult to achieve- studies show long-term success with dietary management in less than 10% of people. Attaining a healthy weight may be especially difficult to achieve in those with severe obesity. In some cases, medications, devices and surgical management might be considered.    What can you do?  If you are overweight or obese and are interested in methods for weight loss, you should discuss this with your provider. In addition, we recommend that you review healthy life styles and methods for weight loss available through the National Institutes of Health patient information sites:   http://win.niddk.nih.gov/publications/index.htm

## 2023-10-09 NOTE — NURSING NOTE
[employer] message sent reminding patient of 1 year follow up appointment.     Cara Butler MA

## 2023-10-15 ENCOUNTER — HEALTH MAINTENANCE LETTER (OUTPATIENT)
Age: 58
End: 2023-10-15

## 2023-10-21 ENCOUNTER — MYC MEDICAL ADVICE (OUTPATIENT)
Dept: PEDIATRICS | Facility: CLINIC | Age: 58
End: 2023-10-21
Payer: COMMERCIAL

## 2023-10-21 DIAGNOSIS — I10 ESSENTIAL HYPERTENSION: ICD-10-CM

## 2023-11-23 ENCOUNTER — MYC MEDICAL ADVICE (OUTPATIENT)
Dept: PEDIATRICS | Facility: CLINIC | Age: 58
End: 2023-11-23
Payer: COMMERCIAL

## 2023-11-23 DIAGNOSIS — I10 ESSENTIAL HYPERTENSION: ICD-10-CM

## 2023-11-24 RX ORDER — OLMESARTAN MEDOXOMIL 40 MG/1
40 TABLET ORAL DAILY
Qty: 90 TABLET | Refills: 1 | Status: CANCELLED | OUTPATIENT
Start: 2023-11-24

## 2023-11-24 NOTE — TELEPHONE ENCOUNTER
"Per chart review:     VV 9/14/23 with Dr Lott:  \"In shared decision making with patient will switch from olmesartan back to lisinopril due to systolics in 140-150s     START taking:  lisinopril (ZESTRIL)  STOP taking:  olmesartan 40 MG tablet (BENICAR)\"    Not active on medication list    Ora HUITRON RN on 11/24/2023 at 7:40 AM     "

## 2023-12-30 ENCOUNTER — HOSPITAL ENCOUNTER (EMERGENCY)
Facility: HOSPITAL | Age: 58
Discharge: HOME | End: 2023-12-30
Attending: EMERGENCY MEDICINE | Admitting: EMERGENCY MEDICINE
Payer: COMMERCIAL

## 2023-12-30 VITALS
BODY MASS INDEX: 40.09 KG/M2 | RESPIRATION RATE: 25 BRPM | HEART RATE: 90 BPM | WEIGHT: 280 LBS | HEIGHT: 70 IN | DIASTOLIC BLOOD PRESSURE: 55 MMHG | OXYGEN SATURATION: 97 % | SYSTOLIC BLOOD PRESSURE: 101 MMHG | TEMPERATURE: 99 F

## 2023-12-30 DIAGNOSIS — E86.0 DEHYDRATION: ICD-10-CM

## 2023-12-30 LAB
ALBUMIN SERPL BCG-MCNC: 4 G/DL (ref 3.5–5.2)
ALBUMIN UR-MCNC: 10 MG/DL
ALP SERPL-CCNC: 54 U/L (ref 40–150)
ALT SERPL W P-5'-P-CCNC: 14 U/L (ref 0–70)
AMORPH CRY #/AREA URNS HPF: ABNORMAL /HPF
ANION GAP SERPL CALCULATED.3IONS-SCNC: 9 MMOL/L (ref 7–15)
APPEARANCE UR: CLEAR
AST SERPL W P-5'-P-CCNC: 15 U/L (ref 0–45)
BASOPHILS # BLD AUTO: 0 10E3/UL (ref 0–0.2)
BASOPHILS NFR BLD AUTO: 0 %
BILIRUB SERPL-MCNC: 0.6 MG/DL
BILIRUB UR QL STRIP: NEGATIVE
BUN SERPL-MCNC: 23.8 MG/DL (ref 6–20)
CALCIUM SERPL-MCNC: 9.1 MG/DL (ref 8.6–10)
CHLORIDE SERPL-SCNC: 97 MMOL/L (ref 98–107)
COLOR UR AUTO: YELLOW
CREAT SERPL-MCNC: 1.27 MG/DL (ref 0.67–1.17)
DEPRECATED HCO3 PLAS-SCNC: 26 MMOL/L (ref 22–29)
EGFRCR SERPLBLD CKD-EPI 2021: 65 ML/MIN/1.73M2
EOSINOPHIL # BLD AUTO: 0.1 10E3/UL (ref 0–0.7)
EOSINOPHIL NFR BLD AUTO: 1 %
ERYTHROCYTE [DISTWIDTH] IN BLOOD BY AUTOMATED COUNT: 12.7 % (ref 10–15)
GLUCOSE SERPL-MCNC: 147 MG/DL (ref 70–99)
GLUCOSE UR STRIP-MCNC: NEGATIVE MG/DL
HCT VFR BLD AUTO: 43.4 % (ref 40–53)
HGB BLD-MCNC: 14.5 G/DL (ref 13.3–17.7)
HGB UR QL STRIP: ABNORMAL
HOLD SPECIMEN: NORMAL
HYALINE CASTS: 6 /LPF
IMM GRANULOCYTES # BLD: 0 10E3/UL
IMM GRANULOCYTES NFR BLD: 0 %
KETONES UR STRIP-MCNC: NEGATIVE MG/DL
LEUKOCYTE ESTERASE UR QL STRIP: NEGATIVE
LYMPHOCYTES # BLD AUTO: 0.3 10E3/UL (ref 0.8–5.3)
LYMPHOCYTES NFR BLD AUTO: 3 %
MAGNESIUM SERPL-MCNC: 1.8 MG/DL (ref 1.7–2.3)
MCH RBC QN AUTO: 30.5 PG (ref 26.5–33)
MCHC RBC AUTO-ENTMCNC: 33.4 G/DL (ref 31.5–36.5)
MCV RBC AUTO: 91 FL (ref 78–100)
MONOCYTES # BLD AUTO: 0.4 10E3/UL (ref 0–1.3)
MONOCYTES NFR BLD AUTO: 5 %
MUCOUS THREADS #/AREA URNS LPF: PRESENT /LPF
NEUTROPHILS # BLD AUTO: 7.5 10E3/UL (ref 1.6–8.3)
NEUTROPHILS NFR BLD AUTO: 91 %
NITRATE UR QL: NEGATIVE
NRBC # BLD AUTO: 0 10E3/UL
NRBC BLD AUTO-RTO: 0 /100
PH UR STRIP: 5.5 [PH] (ref 5–7)
PLATELET # BLD AUTO: 265 10E3/UL (ref 150–450)
POTASSIUM SERPL-SCNC: 4.8 MMOL/L (ref 3.4–5.3)
PROT SERPL-MCNC: 6.9 G/DL (ref 6.4–8.3)
RBC # BLD AUTO: 4.75 10E6/UL (ref 4.4–5.9)
RBC URINE: 9 /HPF
SODIUM SERPL-SCNC: 132 MMOL/L (ref 135–145)
SP GR UR STRIP: 1.03 (ref 1–1.03)
SPERM #/AREA URNS HPF: PRESENT /HPF
UROBILINOGEN UR STRIP-MCNC: <2 MG/DL
WBC # BLD AUTO: 8.3 10E3/UL (ref 4–11)
WBC URINE: 3 /HPF

## 2023-12-30 PROCEDURE — 96360 HYDRATION IV INFUSION INIT: CPT

## 2023-12-30 PROCEDURE — 93005 ELECTROCARDIOGRAM TRACING: CPT | Performed by: EMERGENCY MEDICINE

## 2023-12-30 PROCEDURE — 36415 COLL VENOUS BLD VENIPUNCTURE: CPT | Performed by: STUDENT IN AN ORGANIZED HEALTH CARE EDUCATION/TRAINING PROGRAM

## 2023-12-30 PROCEDURE — 258N000003 HC RX IP 258 OP 636: Performed by: EMERGENCY MEDICINE

## 2023-12-30 PROCEDURE — 83735 ASSAY OF MAGNESIUM: CPT | Performed by: STUDENT IN AN ORGANIZED HEALTH CARE EDUCATION/TRAINING PROGRAM

## 2023-12-30 PROCEDURE — 85025 COMPLETE CBC W/AUTO DIFF WBC: CPT | Performed by: STUDENT IN AN ORGANIZED HEALTH CARE EDUCATION/TRAINING PROGRAM

## 2023-12-30 PROCEDURE — 99284 EMERGENCY DEPT VISIT MOD MDM: CPT | Mod: 25

## 2023-12-30 PROCEDURE — 80053 COMPREHEN METABOLIC PANEL: CPT | Performed by: STUDENT IN AN ORGANIZED HEALTH CARE EDUCATION/TRAINING PROGRAM

## 2023-12-30 PROCEDURE — 81003 URINALYSIS AUTO W/O SCOPE: CPT | Performed by: STUDENT IN AN ORGANIZED HEALTH CARE EDUCATION/TRAINING PROGRAM

## 2023-12-30 PROCEDURE — 93005 ELECTROCARDIOGRAM TRACING: CPT | Performed by: STUDENT IN AN ORGANIZED HEALTH CARE EDUCATION/TRAINING PROGRAM

## 2023-12-30 RX ADMIN — SODIUM CHLORIDE 1000 ML: 9 INJECTION, SOLUTION INTRAVENOUS at 15:47

## 2023-12-30 ASSESSMENT — ACTIVITIES OF DAILY LIVING (ADL)
ADLS_ACUITY_SCORE: 33
ADLS_ACUITY_SCORE: 35

## 2023-12-30 NOTE — ED TRIAGE NOTES
Patient comes in with reports of weakness and dizziness that started this morning. Reports he had teeth pulled recently and has not been drinking much fluids. Denies fever at home. Denies pain or any cardiac issues.

## 2023-12-30 NOTE — ED PROVIDER NOTES
EMERGENCY DEPARTMENT ENCOUNTER            IMPRESSION:  Dehydration        MEDICAL DECISION MAKING:  It was my pleasure to provide care for Enrike Daniel who presented for evaluation of lightheadedness.  He reports decreased oral intake after dental extraction yesterday    On my exam patient is pleasant and cooperative.   Vital signs show slightly low blood pressure.  Physical exam notable for dehydration.  Oral cavity does not show any acute bleeding.     IV fluid administered for symptom relief.  Patient's symptoms improved.     EKG independently interpreted and does not show an acute arrhythmia or ischemia    Laboratory investigation independently interpreted and notable for low sodium and elevated renal function    Patient felt much better after the hydration.  He ambulated and I returned to his baseline.    ED evaluation is consistent with dehydration.      Patient was reevaluated and results were discussed.  I recommended charge home and adequate hydration      Prior to making a final disposition on this patient the results of patient's tests and other diagnostic studies were discussed with the patient. All questions were answered. Patient expressed understanding of the plan and was amenable.    Return precautions and follow-up were discussed.     =================================================================  CHIEF COMPLAINT:  Chief Complaint   Patient presents with    Generalized Weakness    Dizziness         HPI  Enrike Daniel is a 58 year old male with a history of hypertension, T2DM, and hyperlipidemia who presents to the ED by private car for evaluation of generalized weakness and lightheadedness.    The patient reports that he had six teeth extracted yesterday (12/29). He has since had a hard time drinking fluids and keeping them down due to nausea and vomiting. Due to this dehydration, he states that this morning he woke up feeling lightheaded and generally weak. Notes that he felt shaky when he  tried to stand and walk. He does not endorse any significant swelling to his jaw. No known sick contacts. No new medications, after the procedure he states that he was not given any pain medication or antibiotics. No other concerns reported at this time.    REVIEW OF SYSTEMS  Constitutional: Does not report chills, unintentional weight loss. Positive for generalized weakness.   Eyes: Does not report visual changes or discharge    HENT: Does not report sore throat, ear pain or neck pain. Negative for swelling to jaw.  Respiratory: Does not report cough or shortness of breath    Cardiovascular: Does not report chest pain, palpitations or leg swelling  GI: Does not report abdominal pain, nausea, vomiting, or dark, bloody stools.    : Does not report hematuria, dysuria, or flank pain  Musculoskeletal: Does not report any new musculoskeletal pain or new muscle/joint pains.   Skin: Does not report rash or wound  Neurologic: Does not report current headache, focal weakness, or sensory changes. Positive for lightheadedness.      Remainder of systems reviewed, unless noted in HPI all others negative.      PAST MEDICAL HISTORY:  Past Medical History:   Diagnosis Date    Benign essential hypertension     Class 3 severe obesity due to excess calories with serious comorbidity and body mass index (BMI) of 40.0 to 44.9 in adult (H)     Depressive disorder 01/2021    NA    Diabetes mellitus (H)     Essential hypertension     JAH (obstructive sleep apnea)     Type 2 diabetes mellitus, without long-term current use of insulin (H)        PAST SURGICAL HISTORY:  Past Surgical History:   Procedure Laterality Date    ANTERIOR CRUCIATE LIGAMENT REPAIR      GASTRIC BYPASS      ORTHOPEDIC SURGERY  02/2014    Right Miscus repair         CURRENT MEDICATIONS:    alcohol swab prep pads  aspirin (ASA) 81 MG EC tablet  blood glucose (NO BRAND SPECIFIED) test strip  blood glucose calibration (NO BRAND SPECIFIED) solution  blood glucose  monitoring (NO BRAND SPECIFIED) meter device kit  cholecalciferol 50 MCG (2000 UT) tablet  escitalopram (LEXAPRO) 10 MG tablet  lisinopril (ZESTRIL) 20 MG tablet  metFORMIN (GLUCOPHAGE) 1000 MG tablet  Semaglutide, 1 MG/DOSE, (OZEMPIC) 4 MG/3ML pen  simvastatin (ZOCOR) 40 MG tablet  thin (NO BRAND SPECIFIED) lancets        ALLERGIES:  No Known Allergies    FAMILY HISTORY:  Family History   Problem Relation Age of Onset    Colon Cancer No family hx of     Prostate Cancer No family hx of     Coronary Artery Disease No family hx of     Cerebrovascular Disease No family hx of        SOCIAL HISTORY:   Social History     Socioeconomic History    Marital status:     Number of children: 0   Tobacco Use    Smoking status: Never    Smokeless tobacco: Never   Vaping Use    Vaping Use: Never used   Substance and Sexual Activity    Alcohol use: Not Currently    Drug use: Never    Sexual activity: Not Currently     Birth control/protection: None   Other Topics Concern    Parent/sibling w/ CABG, MI or angioplasty before 65F 55M? No     Social Determinants of Health     Financial Resource Strain: Low Risk  (5/11/2023)    Overall Financial Resource Strain (CARDIA)     Difficulty of Paying Living Expenses: Not hard at all   Food Insecurity: No Food Insecurity (5/11/2023)    Hunger Vital Sign     Worried About Running Out of Food in the Last Year: Never true     Ran Out of Food in the Last Year: Never true   Transportation Needs: No Transportation Needs (5/11/2023)    PRAPARE - Transportation     Lack of Transportation (Medical): No     Lack of Transportation (Non-Medical): No   Physical Activity: Inactive (5/11/2023)    Exercise Vital Sign     Days of Exercise per Week: 0 days     Minutes of Exercise per Session: 0 min   Stress: No Stress Concern Present (5/11/2023)    [place] [place] [employer] - Occupational Stress Questionnaire     Feeling of Stress : Not at all   Social Connections: Moderately Isolated  "(5/11/2023)    Social Connection and Isolation Panel [NHANES]     Frequency of Communication with Friends and Family: Once a week     Frequency of Social Gatherings with Friends and Family: Never     Attends Sabianist Services: More than 4 times per year     Active Member of Clubs or Organizations: Yes     Marital Status:    Housing Stability: Low Risk  (5/11/2023)    Housing Stability Vital Sign     Unable to Pay for Housing in the Last Year: No     Number of Places Lived in the Last Year: 1     Unstable Housing in the Last Year: No       PHYSICAL EXAM:    /55   Pulse 90   Temp 99  F (37.2  C) (Oral)   Resp 25   Ht 1.778 m (5' 10\")   Wt 127 kg (280 lb)   SpO2 97%   BMI 40.18 kg/m      Constitutional: Awake, alert, no distress  Head: Normocephalic, atraumatic.  ENT: Mucous membranes are moist.  No pallor.   Eyes: Pupils are reactive.  No discoloration.  Neck: No lymphadenopathy, no stridor, supple, no soft tissue swelling  Chest: No tenderness   Respiratory: Respirations even, unlabored. Lungs clear to ascultation bilaterally, in no acute respiratory distress.  Cardiovascular: Regular rate and rhythm.  Good overall perfusion.  Upper and lower extremity pulses are equal.  GI: Abdomen soft, non-tender to palpation.  No guarding or rebound. Bowel sounds present throughout.   Back: No CVA tenderness.    Musculoskeletal: Moves all 4 extremities equally, full function and capacity no peripheral edema.   Integument: Warm, dry. No rash. No bruising or petechiae.  Neurologic: Alert & oriented x 3. Normal speech. Grossly normal motor and sensory function. No focal deficits noted.    Psychiatric: Normal mood and affect.  Appropriate judgement.    ED COURSE:  3:30 PM Met with and introduced myself to the patient. Discussed history and plan of care.  6:34 PM Rechecked and updated patient on lab results. Discussed plan for discharge.    Medical Decision Making    History:  Supplemental history from: " None  External Record(s) reviewed: External medical records including care everywhere reviewed. Reviewed Clinic visit from 10/9/2023. The patient was seen at [facility] for evaluation of obstructive sleep apnea. Recommended patient continue use of CPAP and return in three weeks for follow-up.     Work Up:  EKG and laboratory studies as ordered were independently interpreted by myself.   Broad differential diagnosis considered for dizziness  The patient's presentation was of moderate complexity.     External consultation:  Discussion of management with another provider: None    Complicating factors:  Patient has a complicated past medical history including hypertension, T2DM, and hyperlipidemia  Care affected by social determinants of health: Access to primary care    Disposition involved shared decision-making with the patient.      LAB:  Laboratory results were independently reviewed and interpreted  Results for orders placed or performed during the hospital encounter of 12/30/23   Comprehensive metabolic panel   Result Value Ref Range    Sodium 132 (L) 135 - 145 mmol/L    Potassium 4.8 3.4 - 5.3 mmol/L    Carbon Dioxide (CO2) 26 22 - 29 mmol/L    Anion Gap 9 7 - 15 mmol/L    Urea Nitrogen 23.8 (H) 6.0 - 20.0 mg/dL    Creatinine 1.27 (H) 0.67 - 1.17 mg/dL    GFR Estimate 65 >60 mL/min/1.73m2    Calcium 9.1 8.6 - 10.0 mg/dL    Chloride 97 (L) 98 - 107 mmol/L    Glucose 147 (H) 70 - 99 mg/dL    Alkaline Phosphatase 54 40 - 150 U/L    AST 15 0 - 45 U/L    ALT 14 0 - 70 U/L    Protein Total 6.9 6.4 - 8.3 g/dL    Albumin 4.0 3.5 - 5.2 g/dL    Bilirubin Total 0.6 <=1.2 mg/dL   Result Value Ref Range    Magnesium 1.8 1.7 - 2.3 mg/dL   UA with Microscopic reflex to Culture    Specimen: Urine, Clean Catch   Result Value Ref Range    Color Urine Yellow Colorless, Straw, Light Yellow, Yellow    Appearance Urine Clear Clear    Glucose Urine Negative Negative mg/dL    Bilirubin Urine Negative Negative     Ketones Urine Negative Negative mg/dL    Specific Gravity Urine 1.028 1.001 - 1.030    Blood Urine 0.03 mg/dL (A) Negative    pH Urine 5.5 5.0 - 7.0    Protein Albumin Urine 10 (A) Negative mg/dL    Urobilinogen Urine <2.0 <2.0 mg/dL    Nitrite Urine Negative Negative    Leukocyte Esterase Urine Negative Negative    Mucus Urine Present (A) None Seen /LPF    Amorphous Crystals Urine Few (A) None Seen /HPF    Sperm Urine Present (A) None Seen /HPF    RBC Urine 9 (H) <=2 /HPF    WBC Urine 3 <=5 /HPF    Hyaline Casts Urine 6 (H) <=2 /LPF   Extra Green Top (Lithium Heparin) Tube   Result Value Ref Range    Hold Specimen JIC    Extra Purple Top Tube   Result Value Ref Range    Hold Specimen JIC    CBC with platelets and differential   Result Value Ref Range    WBC Count 8.3 4.0 - 11.0 10e3/uL    RBC Count 4.75 4.40 - 5.90 10e6/uL    Hemoglobin 14.5 13.3 - 17.7 g/dL    Hematocrit 43.4 40.0 - 53.0 %    MCV 91 78 - 100 fL    MCH 30.5 26.5 - 33.0 pg    MCHC 33.4 31.5 - 36.5 g/dL    RDW 12.7 10.0 - 15.0 %    Platelet Count 265 150 - 450 10e3/uL    % Neutrophils 91 %    % Lymphocytes 3 %    % Monocytes 5 %    % Eosinophils 1 %    % Basophils 0 %    % Immature Granulocytes 0 %    NRBCs per 100 WBC 0 <1 /100    Absolute Neutrophils 7.5 1.6 - 8.3 10e3/uL    Absolute Lymphocytes 0.3 (L) 0.8 - 5.3 10e3/uL    Absolute Monocytes 0.4 0.0 - 1.3 10e3/uL    Absolute Eosinophils 0.1 0.0 - 0.7 10e3/uL    Absolute Basophils 0.0 0.0 - 0.2 10e3/uL    Absolute Immature Granulocytes 0.0 <=0.4 10e3/uL    Absolute NRBCs 0.0 10e3/uL   Extra Red Top Tube   Result Value Ref Range    Hold Specimen JIC          RADIOLOGY:  Radiology reports were independently reviewed and interpreted  No orders to display        EKG:    Performed at: 14:30, 30-Dec-2023    Impression: Sinus rhythm with occasional premature ventricular complexes. Otherwise normal EKG.    Rate: 86 bpm  Rhythm: Sinus rhythm   Axis: 47  TN Interval: 164  QRS: 66  QTc: 414  Comparison EKG:  None    I have independently reviewed and interpreted the EKG(s) documented above.        MEDICATIONS GIVEN IN THE EMERGENCY:  Medications   sodium chloride 0.9% BOLUS 1,000 mL (0 mLs Intravenous Stopped 12/30/23 9870)           NEW PRESCRIPTIONS STARTED AT TODAY'S ER VISIT:  Discharge Medication List as of 12/30/2023  6:57 PM                   FINAL DIAGNOSIS:    ICD-10-CM    1. Dehydration  E86.0                  NAME: Enrike Daniel  AGE: 58 year old male  YOB: 1965  MRN: [id number]  EVALUATION DATE & TIME: 12/30/2023  3:03 PM    PCP: Bernadette Lott    ED PROVIDER: Mike Michele M.D.      I, Dina Zendejas, am serving as a scribe to document services personally performed by Dr. Mike Michele based on my observation and the provider's statements to me. I, Mike Michele MD attest that Dina Zendejas is acting in a scribe capacity, has observed my performance of the services and has documented them in accordance with my direction.    Mike Michele M.D.  Emergency Medicine  [facility] EMERGENCY DEPARTMENT  [facility] [facility] [id number] [phone]  Dept: [phone]  12/30/2023         Mike Michele MD  12/30/23 8097

## 2023-12-30 NOTE — ED NOTES
Patient ambulated fairly well from bed to the bathroom and from bathroom back to his bed.  Urine is been collected. Primary RN is been notified.

## 2023-12-31 NOTE — DISCHARGE INSTRUCTIONS
You were treated for dehydration  There was a small amount of blood in your urine specimen.  You should have this rechecked as an outpatient

## 2024-01-03 ENCOUNTER — TELEPHONE (OUTPATIENT)
Dept: PEDIATRICS | Facility: CLINIC | Age: 59
End: 2024-01-03
Payer: COMMERCIAL

## 2024-01-03 NOTE — TELEPHONE ENCOUNTER
Prior Authorization Retail Medication Request    Medication/Dose: Ozempic 1mg  Diagnosis and ICD code (if different than what is on RX):    New/renewal/insurance change PA/secondary ins. PA:  Previously Tried and Failed:    Rationale:      Insurance   Primary: [employer]  Insurance ID:  [id number]    Secondary (if applicable):  Insurance ID:      Pharmacy Information (if different than what is on RX)  Name:  [facility]  Phone:  [phone]  Fax:     Prior Auth Required-Prescriber use ePA    Or call [phone]                     Reqd-use ePA or try formulary alts     Must have diagnosis of diabetes           Drug Requires Prior Authorization        Thank you,  Johanne Zelaya CPhT  [facility]

## 2024-01-07 NOTE — TELEPHONE ENCOUNTER
PA Initiation    Medication: SEMAGLUTIDE (1 MG/DOSE) 4 MG/3ML SC SOPN  Insurance Company: [facility] - Phone [phone] Fax [phone]  Pharmacy Filling the Rx: [facility] - [facility] [facility]   Filling Pharmacy Phone: [phone]  Filling Pharmacy Fax: [phone]  Start Date: 1/7/2024

## 2024-01-08 LAB
ATRIAL RATE - MUSE: 86 BPM
DIASTOLIC BLOOD PRESSURE - MUSE: NORMAL MMHG
INTERPRETATION ECG - MUSE: NORMAL
P AXIS - MUSE: 64 DEGREES
PR INTERVAL - MUSE: 164 MS
QRS DURATION - MUSE: 66 MS
QT - MUSE: 346 MS
QTC - MUSE: 414 MS
R AXIS - MUSE: 47 DEGREES
SYSTOLIC BLOOD PRESSURE - MUSE: NORMAL MMHG
T AXIS - MUSE: 32 DEGREES
VENTRICULAR RATE- MUSE: 86 BPM

## 2024-01-08 NOTE — TELEPHONE ENCOUNTER
Prior Authorization Approval    Medication: SEMAGLUTIDE (1 MG/DOSE) 4 MG/3ML SC SOPN  Authorization Effective Date: 1/7/2024  Authorization Expiration Date: 1/6/2025  Approved Dose/Quantity:   Reference #:     Insurance Company: [employer] [place] - Phone [phone] Fax [phone]  Expected CoPay: $    CoPay Card Available:      Financial Assistance Needed:   Which Pharmacy is filling the prescription: [facility] [place] [place] - [address]   Pharmacy Notified: YES  Patient Notified: **Instructed pharmacy to notify patient when script is ready to /ship.**

## 2024-01-10 ENCOUNTER — TRANSFERRED RECORDS (OUTPATIENT)
Dept: MULTI SPECIALTY CLINIC | Facility: CLINIC | Age: 59
End: 2024-01-10

## 2024-01-10 LAB — RETINOPATHY: NORMAL

## 2024-01-16 ENCOUNTER — MYC MEDICAL ADVICE (OUTPATIENT)
Dept: PEDIATRICS | Facility: CLINIC | Age: 59
End: 2024-01-16
Payer: COMMERCIAL

## 2024-01-16 DIAGNOSIS — I10 ESSENTIAL HYPERTENSION: ICD-10-CM

## 2024-01-16 DIAGNOSIS — E11.9 CONTROLLED TYPE 2 DIABETES MELLITUS WITHOUT COMPLICATION, WITHOUT LONG-TERM CURRENT USE OF INSULIN (H): Primary | ICD-10-CM

## 2024-01-16 NOTE — TELEPHONE ENCOUNTER
Review of HMPO and standing CMP ordered.    Patient does not have follow up appointment with me scheduled. Recommend he schedule appointment with me after getting labs done to review, check blood pressure, etc.  Last physical was in May.    Bernadette Lott MD  Internal Medicine & Pediatrics  [facility]  She/her

## 2024-02-07 DIAGNOSIS — E66.01 MORBID OBESITY (H): ICD-10-CM

## 2024-02-07 DIAGNOSIS — E11.9 CONTROLLED TYPE 2 DIABETES MELLITUS WITHOUT COMPLICATION, WITHOUT LONG-TERM CURRENT USE OF INSULIN (H): ICD-10-CM

## 2024-02-07 NOTE — TELEPHONE ENCOUNTER
Lab Results   Component Value Date    A1C 6.9 05/06/2023    A1C 7.4 02/11/2023    A1C 7.4 11/20/2022    A1C 6.9 08/29/2022    A1C 6.0 01/21/2022     Has lab orders from previous encounter

## 2024-03-02 ENCOUNTER — LAB (OUTPATIENT)
Dept: LAB | Facility: CLINIC | Age: 59
End: 2024-03-02
Payer: COMMERCIAL

## 2024-03-02 DIAGNOSIS — E11.9 CONTROLLED TYPE 2 DIABETES MELLITUS WITHOUT COMPLICATION, WITHOUT LONG-TERM CURRENT USE OF INSULIN (H): ICD-10-CM

## 2024-03-02 DIAGNOSIS — I10 ESSENTIAL HYPERTENSION: ICD-10-CM

## 2024-03-02 PROCEDURE — 80053 COMPREHEN METABOLIC PANEL: CPT

## 2024-03-02 PROCEDURE — 36415 COLL VENOUS BLD VENIPUNCTURE: CPT

## 2024-03-03 ENCOUNTER — HEALTH MAINTENANCE LETTER (OUTPATIENT)
Age: 59
End: 2024-03-03

## 2024-03-03 LAB
ALBUMIN SERPL BCG-MCNC: 4.3 G/DL (ref 3.5–5.2)
ALP SERPL-CCNC: 54 U/L (ref 40–150)
ALT SERPL W P-5'-P-CCNC: 16 U/L (ref 0–70)
ANION GAP SERPL CALCULATED.3IONS-SCNC: 10 MMOL/L (ref 7–15)
AST SERPL W P-5'-P-CCNC: 31 U/L (ref 0–45)
BILIRUB SERPL-MCNC: 0.4 MG/DL
BUN SERPL-MCNC: 12.4 MG/DL (ref 6–20)
CALCIUM SERPL-MCNC: 9.4 MG/DL (ref 8.6–10)
CHLORIDE SERPL-SCNC: 97 MMOL/L (ref 98–107)
CREAT SERPL-MCNC: 1.22 MG/DL (ref 0.67–1.17)
DEPRECATED HCO3 PLAS-SCNC: 28 MMOL/L (ref 22–29)
EGFRCR SERPLBLD CKD-EPI 2021: 69 ML/MIN/1.73M2
GLUCOSE SERPL-MCNC: 90 MG/DL (ref 70–99)
POTASSIUM SERPL-SCNC: 4.8 MMOL/L (ref 3.4–5.3)
PROT SERPL-MCNC: 7.5 G/DL (ref 6.4–8.3)
SODIUM SERPL-SCNC: 135 MMOL/L (ref 135–145)

## 2024-03-21 DIAGNOSIS — F43.24 ADJUSTMENT DISORDER WITH DISTURBANCE OF CONDUCT: ICD-10-CM

## 2024-03-21 LAB — NONINV COLON CA DNA+OCC BLD SCRN STL QL: NEGATIVE

## 2024-03-21 RX ORDER — ESCITALOPRAM OXALATE 10 MG/1
10 TABLET ORAL DAILY
Qty: 90 TABLET | Refills: 3 | Status: SHIPPED | OUTPATIENT
Start: 2024-03-21

## 2024-03-23 DIAGNOSIS — E11.9 CONTROLLED TYPE 2 DIABETES MELLITUS WITHOUT COMPLICATION, WITHOUT LONG-TERM CURRENT USE OF INSULIN (H): ICD-10-CM

## 2024-04-11 SDOH — HEALTH STABILITY: PHYSICAL HEALTH: ON AVERAGE, HOW MANY DAYS PER WEEK DO YOU ENGAGE IN MODERATE TO STRENUOUS EXERCISE (LIKE A BRISK WALK)?: 3 DAYS

## 2024-04-11 SDOH — HEALTH STABILITY: PHYSICAL HEALTH: ON AVERAGE, HOW MANY MINUTES DO YOU ENGAGE IN EXERCISE AT THIS LEVEL?: 30 MIN

## 2024-04-11 ASSESSMENT — SOCIAL DETERMINANTS OF HEALTH (SDOH): HOW OFTEN DO YOU GET TOGETHER WITH FRIENDS OR RELATIVES?: NEVER

## 2024-04-11 NOTE — COMMUNITY RESOURCES LIST (ENGLISH)
April 11, 2024           YOUR PERSONALIZED LIST OF SERVICES & PROGRAMS           NAVIGATION    Eligibility Screening      Community [facility]  [address] [place] [address] (Distance: 1.1 miles)  Phone: [phone]  Website: [email or web address]://[email or web address]/[email or web address]  Language: English, Mongolian  Fee: Free, Self pay, Sliding scale  Accessibility: Translation services      [facility] ([facility]) of [facility] - SNAP application assistance  [address] [facility] [address] [place] [address] (Distance: 1.7 miles)  Phone: [phone]  Language: English  Fee: Free  Accessibility: Translation services      [employer] - [employer]igators  Phone: [phone]  Website: https://www.mnsure.org/about-us/assister-program/navigators/index.jsp  Language: English  Hours: Mon 8:00 AM - 4:00 PM Tue 8:00 AM - 4:00 PM Wed 8:00 AM - 4:00 PM Thu 8:00 AM - 4:00 PM        ASSISTANCE    Nutrition Benefits      [facility] application assistance  [address] [place] [address] (Distance: 3.0 miles)  Language: English  Fee: Free      Action [facility] ([facility]) [facility] - SNAP application assistance  [address] [address] (Distance: 1.7 miles)  Phone: [phone]  Language: English  Fee: Free  Accessibility: Translation services      [employer] [place] - VISENZE  Phone: [phone]  Website: https://www.Penn Truss Systemsions.org/programs/market-[employer] Worldwide/  Language: English  Hours: Mon 10:00 AM - 5:00 PM Tue 10:00 AM - 5:00 PM Wed 10:00 AM - 5:00 PM Thu 10:00 AM - 5:00 PM Fri 10:00 AM - 5:00 PM  Fee: Self pay    Pantry      [facility] Food Shelf - Food pantry  [address] [place] [address] (Distance: 6.0 miles)  Phone: [phone]  Website: http://www.WarsawKIS Group/  Language: English  Fee: Free      in the [place] - Food in the '[facility]  [address] [place] [address]  (Distance: 7.9 miles)  Phone: [phone]  Website: https://www.goodinthehood.org/our-programs/feeding-the-future/food-in-the-patel/  Language: English  Fee: Free  Accessibility: Ada accessible      [employer]  Phone: (716) [phone]  Website: https://www.[employer].org/empowerment-food-bank  Language: English  Hours: Mon 9:00 AM - 5:00 PM Tue 9:00 AM - 5:00 PM Wed 9:00 AM - 5:00 PM Thu 9:00 AM - 5:00 PM Fri 9:00 AM - 5:00 PM  Fee: Free        & SHELTER    Case Management      [facility] Housing search assistance  [address] [place] [address] (Distance: 1.7 miles)  Phone: [phone]  Language: English, Georgian, French, Hmong  Fee: Free  Accessibility: Ada accessible, Blind accommodation, Deaf or hard of hearing, Translation services      [facility]  [address] [place] [address] (Distance: 1.7 miles)  Phone: [phone]  Language: English  Fee: Free  Accessibility: Translation services, Ada accessible      [employer] - Housing Stabilization Services  Phone: [phone]  Website: https://homebasemn.com/  Language: English  Hours: Mon 8:00 AM - 4:00 PM Tue 8:00 AM - 4:00 PM Wed 8:00 AM - 4:00 PM Thu 8:00 AM - 4:00 PM Fri 8:00 AM - 4:00 PM  Fee: Free  Accessibility: Blind accommodation, Deaf or hard of hearing  Transportation Options: Free transportation    Payment Assistance      [facility] deposit assistance  [address] [place] [address] (Distance: 3.0 miles)  Phone: [phone]  Language: English, Turkish, Ethiopian, Hmong  Fee: Free  Accessibility: Ada accessible, Translation services      County - [place] - Security Deposit Assistance  [address] [place] [address] (Distance: 3.0 miles)  Phone: [phone]  Language: English  Fee: Free      30-[employer] - Keep the Key  Phone: (827) [phone]  Website: https://www.rsv71-vioqjynkpfoovg.org/programs.html   Language: English  Hours: Mon 7:00 AM - 7:00 PM Tue 7:00 AM - 7:00 PM Wed 7:00 AM - 7:00 PM Thu 7:00 AM - 7:00 PM Fri 7:00 AM - 7:00 PM  Fee: Free    Mediation & Eviction Prevention      [facility] - Mortgage Foreclosure Prevention  [address] [place] [address] (Distance: 2.5 miles)  Phone: [phone]  Language: English  Fee: Free  Accessibility: Ada accessible      [employer] - Foreclosure Prevention  [address] [place] [address] (Distance: 2.1 miles)  Phone: [phone]  Website: http://www.Infoxel.org  Language: English, Togolese, Hmong  Fee: Free, Sliding scale  Accessibility: Blind accommodation, Ada accessible      Line - Tenant Rights / Eviction Prevention  Website: https://SNTMNT.org/r-vmot-hh-/  Language: English, Togolese               IMPORTANT NUMBERS & WEBSITES        Emergency Services  911  .   United Way  211 http://211unitedway.org  .   Poison Control  (789) 813-5851 http://[place]poison.org http://wisconsinpoison.org  .     Suicide and Crisis Lifeline  988 http://988lifeline.org  .   Childhelp [place] Child Abuse Hotline  176.420.7297 http://Childhelphotline.org   .   [place] Sexual Assault Hotline  (508) 985-8864 (HOPE) http://Rainn.org   .     National Runaway Safeline  (457) 434-8321 (RUNAWAY) http://1800runaSkinMedica.org  .   Pregnancy & Postpartum Support  Call/text 320-132-0977  [place]: http://ppsupportmn.org  [place]: http://Flashnotes.com/wi  .   Substance Abuse National Helpline ([facility])  154-983-HELP (9168) http://Findtreatment.gov   .                DISCLAIMER: These resources have been generated via the [employer] Platform. [employer] does not endorse any service providers mentioned in this resource list. [employer] does not guarantee that the services mentioned in this resource list will be available to you or will improve your health or wellness.    [facility]

## 2024-04-12 ENCOUNTER — PATIENT OUTREACH (OUTPATIENT)
Dept: CARE COORDINATION | Facility: CLINIC | Age: 59
End: 2024-04-12

## 2024-04-12 ENCOUNTER — OFFICE VISIT (OUTPATIENT)
Dept: PEDIATRICS | Facility: CLINIC | Age: 59
End: 2024-04-12
Payer: COMMERCIAL

## 2024-04-12 VITALS
BODY MASS INDEX: 38.54 KG/M2 | DIASTOLIC BLOOD PRESSURE: 75 MMHG | TEMPERATURE: 98.2 F | HEART RATE: 69 BPM | OXYGEN SATURATION: 97 % | WEIGHT: 269.2 LBS | HEIGHT: 70 IN | SYSTOLIC BLOOD PRESSURE: 122 MMHG | RESPIRATION RATE: 16 BRPM

## 2024-04-12 DIAGNOSIS — E11.9 CONTROLLED TYPE 2 DIABETES MELLITUS WITHOUT COMPLICATION, WITHOUT LONG-TERM CURRENT USE OF INSULIN (H): ICD-10-CM

## 2024-04-12 DIAGNOSIS — I10 ESSENTIAL HYPERTENSION: ICD-10-CM

## 2024-04-12 DIAGNOSIS — Z00.00 ROUTINE GENERAL MEDICAL EXAMINATION AT A HEALTH CARE FACILITY: Primary | ICD-10-CM

## 2024-04-12 DIAGNOSIS — E66.01 MORBID OBESITY (H): ICD-10-CM

## 2024-04-12 DIAGNOSIS — F43.24 ADJUSTMENT DISORDER WITH DISTURBANCE OF CONDUCT: ICD-10-CM

## 2024-04-12 LAB
CHOLEST SERPL-MCNC: 116 MG/DL
CREAT SERPL-MCNC: 1.19 MG/DL (ref 0.67–1.17)
EGFRCR SERPLBLD CKD-EPI 2021: 71 ML/MIN/1.73M2
FASTING STATUS PATIENT QL REPORTED: NO
HBA1C MFR BLD: 6.3 % (ref 0–5.6)
HDLC SERPL-MCNC: 28 MG/DL
LDLC SERPL CALC-MCNC: 59 MG/DL
NONHDLC SERPL-MCNC: 88 MG/DL
TRIGL SERPL-MCNC: 143 MG/DL

## 2024-04-12 PROCEDURE — 36415 COLL VENOUS BLD VENIPUNCTURE: CPT | Performed by: STUDENT IN AN ORGANIZED HEALTH CARE EDUCATION/TRAINING PROGRAM

## 2024-04-12 PROCEDURE — 99396 PREV VISIT EST AGE 40-64: CPT | Performed by: STUDENT IN AN ORGANIZED HEALTH CARE EDUCATION/TRAINING PROGRAM

## 2024-04-12 PROCEDURE — 82565 ASSAY OF CREATININE: CPT | Performed by: STUDENT IN AN ORGANIZED HEALTH CARE EDUCATION/TRAINING PROGRAM

## 2024-04-12 PROCEDURE — 82043 UR ALBUMIN QUANTITATIVE: CPT | Performed by: STUDENT IN AN ORGANIZED HEALTH CARE EDUCATION/TRAINING PROGRAM

## 2024-04-12 PROCEDURE — 82570 ASSAY OF URINE CREATININE: CPT | Performed by: STUDENT IN AN ORGANIZED HEALTH CARE EDUCATION/TRAINING PROGRAM

## 2024-04-12 PROCEDURE — 83036 HEMOGLOBIN GLYCOSYLATED A1C: CPT | Performed by: STUDENT IN AN ORGANIZED HEALTH CARE EDUCATION/TRAINING PROGRAM

## 2024-04-12 PROCEDURE — 80061 LIPID PANEL: CPT | Performed by: STUDENT IN AN ORGANIZED HEALTH CARE EDUCATION/TRAINING PROGRAM

## 2024-04-12 SDOH — HEALTH STABILITY: PHYSICAL HEALTH: ON AVERAGE, HOW MANY DAYS PER WEEK DO YOU ENGAGE IN MODERATE TO STRENUOUS EXERCISE (LIKE A BRISK WALK)?: 3 DAYS

## 2024-04-12 SDOH — HEALTH STABILITY: PHYSICAL HEALTH: ON AVERAGE, HOW MANY MINUTES DO YOU ENGAGE IN EXERCISE AT THIS LEVEL?: 30 MIN

## 2024-04-12 ASSESSMENT — PAIN SCALES - GENERAL: PAINLEVEL: NO PAIN (0)

## 2024-04-12 ASSESSMENT — SOCIAL DETERMINANTS OF HEALTH (SDOH): HOW OFTEN DO YOU GET TOGETHER WITH FRIENDS OR RELATIVES?: NEVER

## 2024-04-12 NOTE — COMMUNITY RESOURCES LIST (ENGLISH)
April 12, 2024           YOUR PERSONALIZED LIST OF SERVICES & PROGRAMS           NAVIGATION    Eligibility Screening      [facility] Food HelpLine  Phone: (583) 930-1689  Website: https://www.Triptelligent.org/find-help/  Language: English, Italian  Hours: Mon 10:00 AM - 5:00 PM Tue 10:00 AM - 5:00 PM Wed 10:00 AM - 5:00 PM Thu 10:00 AM - 5:00 PM Fri 10:00 AM - 5:00 PM  Fee: Free  Accessibility: Ada accessible, Blind accommodation, Deaf or hard of hearing, Translation services      [employer] [place] - SNAP (formerly food stamps) Screening and Application help  Phone: [phone] [phone]  Website: https://www.Triptelligent.org/programs/[place]-food-helpline/  Language: English  Hours: Mon 10:00 AM - 5:00 PM Tue 10:00 AM - 5:00 PM Wed 10:00 AM - 5:00 PM Thu 10:00 AM - 5:00 PM Fri 10:00 AM - 5:00 PM  Fee: Free  Accessibility: Ada accessible, Blind accommodation, Deaf or hard of hearing, Translation services      [employer] - [employer]igators  Phone: (609) 686-8399  Website: https://www.[facility].org/about-us/assister-program/navigators/index.jsp  Language: English  Hours: Mon 8:00 AM - 4:00 PM Tue 8:00 AM - 4:00 PM Wed 8:00 AM - 4:00 PM Thu 8:00 AM - 4:00 PM        ASSISTANCE    Nutrition Benefits      [facility] Food HelpLine  Phone: [phone]) [phone]  Website: https://www.FlyDataExepron.org/find-help/  Language: English, Italian  Hours: Mon 10:00 AM - 5:00 PM Tue 10:00 AM - 5:00 PM Wed 10:00 AM - 5:00 PM Thu 10:00 AM - 5:00 PM Fri 10:00 AM - 5:00 PM  Fee: Free  Accessibility: Ada accessible, Blind accommodation, Deaf or hard of hearing, Translation services      [employer] [place] [employer]  Phone: ([phone]) [phone]  Website: https://www.Triptelligent.org/programs/market-bucks/  Language: English  Hours: Mon 10:00 AM - 5:00 PM Tue 10:00 AM - 5:00 PM Wed 10:00 AM - 5:00 PM Thu 10:00 AM - 5:00 PM Fri 10:00 AM - 5:00 PM  Fee: Self pay      [facility] [place] - SNAP  (formerly food stamps) Screening and Application help  Phone: [phone]  Website: https://www.Second Chance StaffingKairos4.org/programs/[place]-food-helpline/  Language: English  Hours: Mon 10:00 AM - 5:00 PM Tue 10:00 AM - 5:00 PM Wed 10:00 AM - 5:00 PM Thu 10:00 AM - 5:00 PM Fri 10:00 AM - 5:00 PM  Fee: Free  Accessibility: Ada accessible, Blind accommodation, Deaf or hard of hearing, Translation services    [facility] Food Shelf - Food pantry  [address] [place] [address] (Distance: 6.0 miles)  Phone: [phone]  Website: http://www.Cephasonics/  Language: English  Fee: Free      [employer] [place] - Food Shelf   Phone: ([phone] 660-9746  Website: https://www.Landscape Mobile.org/find-help/  Language: English  Hours: Mon 10:00 AM - 5:00 PM Tue 10:00 AM - 5:00 PM Wed 10:00 AM - 5:00 PM Thu 10:00 AM - 5:00 PM Fri 10:00 AM - 5:00 PM  Fee: Free  Accessibility: Ada accessible, Blind accommodation, Deaf or hard of hearing, Translation services      [employer] Shel[employer] - [place] [employer] Food Shelf  Phone: [phone]  Website: www.[employer].org  Language: English, Citizen of [place]  Hours: Mon 9:00 AM - 3:30 PM Tue 9:00 AM - 6:30 PM Wed 9:00 AM - 3:30 PM Thu 9:00 AM - 12:30 PM Fri 9:00 AM - 12:30 PM Sat 9:00 AM - 12:00 PM  Fee: Free        & SHELTER    Case Management      - Online housing search assistance  [address] [place] [address] [address] [address] (Distance: 7.8 miles)  Phone: [phone]) [phone]  Website: http://www.Elevate Researchlink.org/  Language: English, Citizen of [place], British, Hmong  Accessibility: Ada accessible      Housing Online - https://Refulgent Software.Empathy Marketing/  [address] [address] (Distance: 6.7 miles)  Website: https://Peer5  Language: English      [employer] - Housing Stabilization Services  Phone: (655) 632-7867  Website: https://Labcyte.Empathy Marketing/  Language: English  Hours: Mon 8:00 AM - 4:00 PM Tue 8:00 AM  - 4:00 PM Wed 8:00 AM - 4:00 PM Thu 8:00 AM - 4:00 PM Fri 8:00 AM - 4:00 PM  Fee: Free  Accessibility: Blind accommodation, Deaf or hard of hearing  Transportation Options: Free transportation    Payment Assistance      [facility] deposit assistance  [address] [place] [address] (Distance: 3.0 miles)  Phone: [phone]  Language: English, Zimbabwean, Indian, Hmong  Fee: Free  Accessibility: Ada accessible, Translation services      [place] - [place] - Security Deposit Assistance  [address] [place] [address] (Distance: 3.0 miles)  Phone: [phone]  Language: English  Fee: Free      30-Days Foundation - Keep the Key  Phone: (261) 562-1230  Website: https://www.gyw32-mplwihhixxyojt.org/programs.html  Language: English  Hours: Mon 7:00 AM - 7:00 PM Tue 7:00 AM - 7:00 PM Wed 7:00 AM - 7:00 PM Thu 7:00 AM - 7:00 PM Fri 7:00 AM - 7:00 PM  Fee: Free    Mediation & Eviction Prevention      [facility] Human[facility] - Mortgage Foreclosure Prevention  [address] [place] [address] (Distance: 2.5 miles)  Phone: [phone]  Language: English  Fee: Free  Accessibility: Ada accessible      [employer] - Foreclosure Prevention  [address] [place] [address] (Distance: 2.1 miles)  Phone: [phone]  Website: http://www.[facility].org  Language: English, Zimbabwean, Hmong  Fee: Free, Sliding scale  Accessibility: Blind accommodation, Ada accessible      Line - Tenant Rights / Eviction Prevention  Website: https://homelinemn.org/k-ydbb-uk-/  Language: English, Zimbabwean               IMPORTANT NUMBERS & WEBSITES        Emergency Services  911  .   United Way  211 http://211unitedway.org  .   Poison Control  (848) 596-9486 http://[place]poison.org http://wisconsinpoison.org  .     Suicide and Crisis Lifeline  988 http://988Bon Secours Health Systemline.org  .   Childhelp [place] Child Abuse Hotline  390.415.1078 http://Childhelphotline.org   .   National Sexual Assault  Hotline  (634) 759-5329 (HOPE) http://Helidynen.Mobspire   .     National Runaway Safeline  (117) 479-6998 (RUNAWAY) http://Bawte.Mobspire  .   Pregnancy & Postpartum Support  Call/text 586-432-4111  [place]: http://ppsupportmn.org  [place]: http://psichapters.com/wi  .   Substance Abuse National Helpline ([facility])  897-132-HELP (2696) http://Findtreatment.gov   .                DISCLAIMER: These resources have been generated via the [employer] Platform. [employer] does not endorse any service providers mentioned in this resource list. [employer] does not guarantee that the services mentioned in this resource list will be available to you or will improve your health or wellness.    [facility]

## 2024-04-12 NOTE — PROGRESS NOTES
{PROVIDER CHARTING PREFERENCE:364874}    Kathya Calvert is a 58 year old, presenting for the following health issues:  No chief complaint on file.      4/12/2024     2:25 PM   Additional Questions   Roomed by [facility]   Accompanied by self         4/12/2024     2:25 PM   Patient Reported Additional Medications   Patient reports taking the following new medications none     Via the Health Maintenance questionnaire, the patient has reported the following services have been completed -Eye Exam, this information has been sent to the abstraction team.  History of Present Illness       Reason for visit:  Yearly physical    He eats 0-1 servings of fruits and vegetables daily.He consumes 0 sweetened beverage(s) daily.He exercises with enough effort to increase his heart rate 30 to 60 minutes per day.  He exercises with enough effort to increase his heart rate 3 or less days per week.   He is taking medications regularly.       {MA/LPN/RN Pre-Provider Visit Orders- hCG/UA/Strep (Optional):972712}  {SUPERLIST (Optional):454009}  {additonal problems for provider to add (Optional):923007}    {ROS Picklists (Optional):078257}      Objective    There were no vitals taken for this visit.  There is no height or weight on file to calculate BMI.  Physical Exam   {Exam List (Optional):397240}    {Diagnostic Test Results (Optional):457503}        Signed Electronically by: Deirdre E. Milligan, MD  {Email feedback regarding this note to [email or web address]   :[id number]}

## 2024-04-12 NOTE — PATIENT INSTRUCTIONS
Preventive Care Advice   This is general advice given by our system to help you stay healthy. However, your care team may have specific advice just for you. Please talk to your care team about your preventive care needs.  Nutrition  Eat 5 or more servings of fruits and vegetables each day.  Try wheat bread, brown rice and whole grain pasta (instead of white bread, rice, and pasta).  Get enough calcium and vitamin D. Check the label on foods and aim for 100% of the RDA (recommended daily allowance).  Lifestyle  Exercise at least 150 minutes each week   (30 minutes a day, 5 days a week).  Do muscle strengthening activities 2 days a week. These help control your weight and prevent disease.  No smoking.  Wear sunscreen to prevent skin cancer.  Have a dental exam and cleaning every 6 months.  Yearly exams  See your health care team every year to talk about:  Any changes in your health.  Any medicines your care team has prescribed.  Preventive care, family planning, and ways to prevent chronic diseases.  Shots (vaccines)   HPV shots (up to age 26), if you've never had them before.  Hepatitis B shots (up to age 59), if you've never had them before.  COVID-19 shot: Get this shot when it's due.  Flu shot: Get a flu shot every year.  Tetanus shot: Get a tetanus shot every 10 years.  Pneumococcal, hepatitis A, and RSV shots: Ask your care team if you need these based on your risk.  Shingles shot (for age 50 and up).  General health tests  Diabetes screening:  Starting at age 35, Get screened for diabetes at least every 3 years.  If you are younger than age 35, ask your care team if you should be screened for diabetes.  Cholesterol test: At age 39, start having a cholesterol test every 5 years, or more often if advised.  Bone density scan (DEXA): At age 50, ask your care team if you should have this scan for osteoporosis (brittle bones).  Hepatitis C: Get tested at least once in your life.  STIs (sexually transmitted  infections)  Before age 24: Ask your care team if you should be screened for STIs.  After age 24: Get screened for STIs if you're at risk. You are at risk for STIs (including HIV) if:  You are sexually active with more than one person.  You don't use condoms every time.  You or a partner was diagnosed with a sexually transmitted infection.  If you are at risk for HIV, ask about PrEP medicine to prevent HIV.  Get tested for HIV at least once in your life, whether you are at risk for HIV or not.  Cancer screening tests  Cervical cancer screening: If you have a cervix, begin getting regular cervical cancer screening tests at age 21. Most people who have regular screenings with normal results can stop after age 65. Talk about this with your provider.  Breast cancer scan (mammogram): If you've ever had breasts, begin having regular mammograms starting at age 40. This is a scan to check for breast cancer.  Colon cancer screening: It is important to start screening for colon cancer at age 45.  Have a colonoscopy test every 10 years (or more often if you're at risk) Or, ask your provider about stool tests like a FIT test every year or Cologuard test every 3 years.  To learn more about your testing options, visit: [email or web address] [email or web address].pdf.  For help making a decision, visit: https://bit.ly/sn51872.  Prostate cancer screening test: If you have a prostate and are age 55 to 69, ask your provider if you would benefit from a yearly prostate cancer screening test.  Lung cancer screening: If you are a current or former smoker age 50 to 80, ask your care team if ongoing lung cancer screenings are right for you.  For informational purposes only. Not to replace the advice of your health care provider. Copyright   2023 [employer]. All rights reserved. Clinically reviewed by the [facility]. PostalGuard 690598 - REV 01/24.    Preventing Falls: Care Instructions  Injuries and health  problems such as trouble walking or poor eyesight can increase your risk of falling. So can some medicines. But there are things you can do to help prevent falls. You can exercise to get stronger. You can also arrange your home to make it safer.    Talk to your doctor about the medicines you take. Ask if any of them increase the risk of falls and whether they can be changed or stopped.   Try to exercise regularly. It can help improve your strength and balance. This can help lower your risk of falling.     Practice fall safety and prevention.    Wear low-heeled shoes that fit well and give your feet good support. Talk to your doctor if you have foot problems that make this hard.  Carry a cellphone or wear a medical alert device that you can use to call for help.  Use stepladders instead of chairs to reach high objects. Don't climb if you're at risk for falls. Ask for help, if needed.  Wear the correct eyeglasses, if you need them.    Make your home safer.    Remove rugs, cords, clutter, and furniture from walkways.  Keep your house well lit. Use night-lights in hallways and bathrooms.  Install and use sturdy handrails on stairways.  Wear nonskid footwear, even inside. Don't walk barefoot or in socks without shoes.    Be safe outside.    Use handrails, curb cuts, and ramps whenever possible.  Keep your hands free by using a shoulder bag or backpack.  Try to walk in well-lit areas. Watch out for uneven ground, changes in pavement, and debris.  Be careful in the winter. Walk on the grass or gravel when sidewalks are slippery. Use de-icer on steps and walkways. Add non-slip devices to shoes.    Put grab bars and nonskid mats in your shower or tub and near the toilet. Try to use a shower chair or bath bench when bathing.   Get into a tub or shower by putting in your weaker leg first. Get out with your strong side first. Have a phone or medical alert device in the bathroom with you.   Where can you learn more?  Go to  "https://www.healthCurb Call.net/patiented  Enter G117 in the search box to learn more about \"Preventing Falls: Care Instructions.\"  Current as of: July 17, 2023               Content Version: 14.0    7739-3962 Healthwise, Incorporated.   Care instructions adapted under license by your healthcare professional. If you have questions about a medical condition or this instruction, always ask your healthcare professional. Healthwise, Incorporated disclaims any warranty or liability for your use of this information.      Relationships for Good Health  Relationships are important for our health and happiness. Social isolation, loneliness and lack of support are bad for your health. Studies show that loneliness can harm health and limit your life span as much as high blood pressure and smoking.   Take some time to reflect on your relationships. Then answer these questions:  Are there people in your life that cause you stress or drain your energy? What can you do to set limits?  ________________________________________________________________________________________________________________________________________________________________________________________________________________________________________________________________________________________________________________________________________________  Who do you enjoy spending time with? Who can you go to for support?  ________________________________________________________________________________________________________________________________________________________________________________________________________________________________________________________________________________________________________________________________________________  What can you do to improve your relationships with " others?  __________________________________________________________________________________________________________________________________________________________________________________________________________________  ______________________________________________________________________________________________________________________________  What do you like most about your relationships with others?  ________________________________________________________________________________________________________________________________________________________________________________________________________________________________________________________________________________________________________________________________________________  My goal: ______________________________________________________________________  I will ______________________________________________________________________________________________________________________________________________________________________________________________    For informational purposes only. Not to replace the advice of your health care provider. Copyright   2018 [facility]. All rights reserved. Clinically reviewed by B[facility] Health  Team. SMARTworks 673296 - Rev 04/21.    Learning About Stress  What is stress?     Stress is your body's response to a hard situation. Your body can have a physical, emotional, or mental response. Stress is a fact of life for most people, and it affects everyone differently. What causes stress for you may not be stressful for someone else.  A lot of things can cause stress. You may feel stress when you go on a job interview, take a test, or run a race. This kind of short-term stress is normal and even useful. It can help you if you need to work hard or react quickly. For example, stress can help you finish an important job on time.  Long-term stress is caused by ongoing stressful situations or events. Examples  of long-term stress include long-term health problems, ongoing problems at work, or conflicts in your family. Long-term stress can harm your health.  How does stress affect your health?  When you are stressed, your body responds as though you are in danger. It makes hormones that speed up your heart, make you breathe faster, and give you a burst of energy. This is called the fight-or-flight stress response. If the stress is over quickly, your body goes back to normal and no harm is done.  But if stress happens too often or lasts too long, it can have bad effects. Long-term stress can make you more likely to get sick, and it can make symptoms of some diseases worse. If you tense up when you are stressed, you may develop neck, shoulder, or low back pain. Stress is linked to high blood pressure and heart disease.  Stress also harms your emotional health. It can make you oleary, tense, or depressed. Your relationships may suffer, and you may not do well at work or school.  What can you do to manage stress?  You can try these things to help manage stress:   Do something active. Exercise or activity can help reduce stress. Walking is a great way to get started. Even everyday activities such as housecleaning or yard work can help.  Try yoga or jennifer chi. These techniques combine exercise and meditation. You may need some training at first to learn them.  Do something you enjoy. For example, listen to music or go to a movie. Practice your hobby or do volunteer work.  Meditate. This can help you relax, because you are not worrying about what happened before or what may happen in the future.  Do guided imagery. Imagine yourself in any setting that helps you feel calm. You can use online videos, books, or a teacher to guide you.  Do breathing exercises. For example:  From a standing position, bend forward from the waist with your knees slightly bent. Let your arms dangle close to the floor.  Breathe in slowly and deeply as you  "return to a standing position. Roll up slowly and lift your head last.  Hold your breath for just a few seconds in the standing position.  Breathe out slowly and bend forward from the waist.  Let your feelings out. Talk, laugh, cry, and express anger when you need to. Talking with supportive friends or family, a counselor, or a karina leader about your feelings is a healthy way to relieve stress. Avoid discussing your feelings with people who make you feel worse.  Write. It may help to write about things that are bothering you. This helps you find out how much stress you feel and what is causing it. When you know this, you can find better ways to cope.  What can you do to prevent stress?  You might try some of these things to help prevent stress:  Manage your time. This helps you find time to do the things you want and need to do.  Get enough sleep. Your body recovers from the stresses of the day while you are sleeping.  Get support. Your family, friends, and community can make a difference in how you experience stress.  Limit your news feed. Avoid or limit time on social media or news that may make you feel stressed.  Do something active. Exercise or activity can help reduce stress. Walking is a great way to get started.  Where can you learn more?  Go to https://www.MundoHablado.com.net/patiented  Enter N032 in the search box to learn more about \"Learning About Stress.\"  Current as of: October 24, 2023               Content Version: 14.0    4512-1203 [employer].   Care instructions adapted under license by your healthcare professional. If you have questions about a medical condition or this instruction, always ask your healthcare professional. [employer] disclaims any warranty or liability for your use of this information.      "

## 2024-04-12 NOTE — COMMUNITY RESOURCES LIST (ENGLISH)
April 12, 2024           YOUR PERSONALIZED LIST OF SERVICES & PROGRAMS           NAVIGATION    Eligibility Screening      [facility] Food HelpLine  Phone: (984) 197-4932  Website: https://www.iBio.org/find-help/  Language: English, Bahamian  Hours: Mon 10:00 AM - 5:00 PM Tue 10:00 AM - 5:00 PM Wed 10:00 AM - 5:00 PM Thu 10:00 AM - 5:00 PM Fri 10:00 AM - 5:00 PM  Fee: Free  Accessibility: Ada accessible, Blind accommodation, Deaf or hard of hearing, Translation services      [employer] [place] - SNAP (formerly food stamps) Screening and Application help  Phone: [phone]) [phone]  Website: https://www.iBio.org/programs/[place]-food-helpline/  Language: English  Hours: Mon 10:00 AM - 5:00 PM Tue 10:00 AM - 5:00 PM Wed 10:00 AM - 5:00 PM Thu 10:00 AM - 5:00 PM Fri 10:00 AM - 5:00 PM  Fee: Free  Accessibility: Ada accessible, Blind accommodation, Deaf or hard of hearing, Translation services      [employer] - Navigators  Phone: (122) [phone]  Website: https://www.[facility].org/about-us/assister-program/navigators/index.jsp  Language: English  Hours: Mon 8:00 AM - 4:00 PM Tue 8:00 AM - 4:00 PM Wed 8:00 AM - 4:00 PM Thu 8:00 AM - 4:00 PM        ASSISTANCE    Nutrition Benefits      [facility] Food HelpLine  Phone: (277) 924-9808  Website: https://www.iBio.org/find-help/  Language: English, Bahamian  Hours: Mon 10:00 AM - 5:00 PM Tue 10:00 AM - 5:00 PM Wed 10:00 AM - 5:00 PM Thu 10:00 AM - 5:00 PM Fri 10:00 AM - 5:00 PM  Fee: Free  Accessibility: Ada accessible, Blind accommodation, Deaf or hard of hearing, Translation services      [employer] [place] [employer] SNAP (formerly food stamps) Screening and Application help  Phone: [phone]) [phone]  Website: https://www.iBio.org/programs/[place]-food-helpline/  Language: English  Hours: Mon 10:00 AM - 5:00 PM Tue 10:00 AM - 5:00 PM Wed 10:00 AM - 5:00 PM Thu 10:00 AM - 5:00 PM Fri 10:00 AM - 5:00 PM   Fee: Free  Accessibility: Ada accessible, Blind accommodation, Deaf or hard of hearing, Translation services      [employer] [place] - Market [place]  Phone: [phone]) [phone]  Website: https://www.Peg Bandwidth.org/programs/marketSensorlyLugoff/  Language: English  Hours: Mon 10:00 AM - 5:00 PM Tue 10:00 AM - 5:00 PM Wed 10:00 AM - 5:00 PM Thu 10:00 AM - 5:00 PM Fri 10:00 AM - 5:00 PM  Fee: Self pay    Pantry      [facility] Food Shelf - Food pantry  [address] [place] [address] (Distance: 6.0 miles)  Phone: [phone]) [phone]  Website: http://www.Relypsa/  Language: English  Fee: Free      [facility] [place] [employer] Shelf   Phone: [phone]) [phone]  Website: https://www.Peg Bandwidth.org/find-help/  Language: English  Hours: Mon 10:00 AM - 5:00 PM Tue 10:00 AM - 5:00 PM Wed 10:00 AM - 5:00 PM Thu 10:00 AM - 5:00 PM Fri 10:00 AM - 5:00 PM  Fee: Free  Accessibility: Ada accessible, Blind accommodation, Deaf or hard of hearing, Translation services      [facility].com  Phone: ([phone]) [phone]  Website: https://www.Cashkaro.Kiala/[employer]-food-bank  Language: English  Hours: Mon 9:00 AM - 5:00 PM Tue 9:00 AM - 5:00 PM Wed 9:00 AM - 5:00 PM Thu 9:00 AM - 5:00 PM Fri 9:00 AM - 5:00 PM  Fee: Free        & SHELTER    Case Management      - Online housing search assistance  [address] [address] (Distance: 7.8 miles)  Phone: [phone]) [phone]  Website: http://www.Raincrow Studioslink.org/  Language: English, Sudanese, Chilean, Hmong  Accessibility: Ada accessible      Housing Online - https://[employer]/  [address] [place] [address] (Distance: 6.7 miles)  Website: https://[employer]  Language: English      [employer] - Housing Stabilization Services  Phone: (841) [phone]  Website: https://Astro.Raise/  Language: English  Hours: Mon 8:00 AM - 4:00 PM Tue 8:00 AM - 4:00 PM Wed 8:00 AM - 4:00  PM Thu 8:00 AM - 4:00 PM Fri 8:00 AM - 4:00 PM  Fee: Free  Accessibility: Blind accommodation, Deaf or hard of hearing  Transportation Options: Free transportation    Payment Assistance      [facility] deposit assistance  [address] [place] [address] (Distance: 3.0 miles)  Phone: [phone]  Language: English, Armenian, Mosotho, Hmong  Fee: Free  Accessibility: Ada accessible, Translation services      [place] - [place] - Security Deposit Assistance  [address] [place] [address] (Distance: 3.0 miles)  Phone: [phone]) [phone]  Language: English  Fee: Free      [employer] - Keep the Key  Phone: (410) 130-3955  Website: https://www.ufw85-aeutvfftnnrhwf.org/programs.html  Language: English  Hours: Mon 7:00 AM - 7:00 PM Tue 7:00 AM - 7:00 PM Wed 7:00 AM - 7:00 PM Thu 7:00 AM - 7:00 PM Fri 7:00 AM - 7:00 PM  Fee: Free    Mediation & Eviction Prevention      [facility] Humanity - Mortgage Foreclosure Prevention  [address] [place] [address] (Distance: 2.5 miles)  Phone: [phone]) [phone]  Language: English  Fee: Free  Accessibility: Ada accessible      [employer] - Foreclosure Prevention  [address] [place] [address] (Distance: 2.1 miles)  Phone: [phone]) [phone]  Website: http://www.[facility] Force [facility] Hospitalepartners.org  Language: English, Armenian, Hmong  Fee: Free, Sliding scale  Accessibility: Blind accommodation, Ada accessible      Line - Tenant Rights / Eviction Prevention  Website: https://homelinemn.org/j-xekf-sk-/  Language: English, Armenian               IMPORTANT NUMBERS & WEBSITES        Emergency Services  911  .   United Way  211 http://211unitedway.org  .   Poison Control  (104) 694-2339 http://[place]poison.org http://wisconsinpoison.org  .     Suicide and Crisis Lifeline  988 http://988lifeline.org  .   Childhelp National Child Abuse Hotline  588.263.1228 http://Childhelphotline.org   .   National Sexual Assault Hotline  (663) 505-1617 (HOPE)  http://Rainn.org   .     National Runaway Safeline  (613) 379-6663 (RUNAWAY) http://EntigorunaThree Ring.org  .   Pregnancy & Postpartum Support  Call/text 721-068-6900  [place]: http://ppsupportmn.org  [place]: http://psichapters.com/wi  .   Substance Abuse National Helpline ([facility])  833-581-HELP (7117) http://Findtreatment.gov   .                DISCLAIMER: These resources have been generated via the [employer] Platform. [employer] does not endorse any service providers mentioned in this resource list. [employer] does not guarantee that the services mentioned in this resource list will be available to you or will improve your health or wellness.    [facility]

## 2024-04-12 NOTE — COMMUNITY RESOURCES LIST (ENGLISH)
April 12, 2024           YOUR PERSONALIZED LIST OF SERVICES & PROGRAMS           NAVIGATION    Eligibility Screening      [facility] Food HelpLine  Phone: (507) 343-1187  Website: https://www.[employer].org/find-help/  Language: English, Colombian  Hours: Mon 10:00 AM - 5:00 PM Tue 10:00 AM - 5:00 PM Wed 10:00 AM - 5:00 PM Thu 10:00 AM - 5:00 PM Fri 10:00 AM - 5:00 PM  Fee: Free  Accessibility: Ada accessible, Blind accommodation, Deaf or hard of hearing, Translation services      [employer] [place] - SNAP (formerly food stamps) Screening and Application help  Phone: (516) 577-1349  Website: https://www.[employer].org/programs/[place]-food-helpline/  Language: English  Hours: Mon 10:00 AM - 5:00 PM Tue 10:00 AM - 5:00 PM Wed 10:00 AM - 5:00 PM Thu 10:00 AM - 5:00 PM Fri 10:00 AM - 5:00 PM  Fee: Free  Accessibility: Ada accessible, Blind accommodation, Deaf or hard of hearing, Translation services      [employer] - Navigators  Phone: (466) 206-1782  Website: https://www.[facility].org/about-us/assister-program/navigators/index.jsp  Language: English  Hours: Mon 8:00 AM - 4:00 PM Tue 8:00 AM - 4:00 PM Wed 8:00 AM - 4:00 PM Thu 8:00 AM - 4:00 PM        ASSISTANCE    Nutrition Benefits      [facility] Food HelpLine  Phone: (222) 990-2881  Website: https://www.[employer].org/find-help/  Language: English, Colombian  Hours: Mon 10:00 AM - 5:00 PM Tue 10:00 AM - 5:00 PM Wed 10:00 AM - 5:00 PM Thu 10:00 AM - 5:00 PM Fri 10:00 AM - 5:00 PM  Fee: Free  Accessibility: Ada accessible, Blind accommodation, Deaf or hard of hearing, Translation services      [employer] [place] [employer] SNAP (formerly food stamps) Screening and Application help  Phone: (755) 243-8915  Website: https://www.[employer].org/programs/[place]-food-helpline/  Language: English  Hours: Mon 10:00 AM - 5:00 PM Tue 10:00 AM - 5:00 PM Wed 10:00 AM - 5:00 PM Thu 10:00 AM - 5:00 PM Fri 10:00 AM - 5:00 PM   Fee: Free  Accessibility: Ada accessible, Blind accommodation, Deaf or hard of hearing, Translation services      [employer] [place] - Meographs  Phone: (161) [phone]  Website: https://www.NetEffect.org/programs/marketBorqsCordova/  Language: English  Hours: Mon 10:00 AM - 5:00 PM Tue 10:00 AM - 5:00 PM Wed 10:00 AM - 5:00 PM Thu 10:00 AM - 5:00 PM Fri 10:00 AM - 5:00 PM  Fee: Self pay    Pantry      [facility] Food Shelf - Food pantry  [address] [place] [address] (Distance: 6.0 miles)  Phone: (337) 361-5569  Website: http://www.[employer]/  Language: English  Fee: Free      [employer] [place] bttn Shelf   Phone: (842) 688-1723  Website: https://www.[employer].org/find-help/  Language: English  Hours: Mon 10:00 AM - 5:00 PM Tue 10:00 AM - 5:00 PM Wed 10:00 AM - 5:00 PM Thu 10:00 AM - 5:00 PM Fri 10:00 AM - 5:00 PM  Fee: Free  Accessibility: Ada accessible, Blind accommodation, Deaf or hard of hearing, Translation services      [employer] Food Shelf - [place] [employer] Food Shelf  Phone: (295) 722-4835  Website: www.HealthEngine.[employer]  Language: English, Kittitian  Hours: Mon 9:00 AM - 3:30 PM Tue 9:00 AM - 6:30 PM Wed 9:00 AM - 3:30 PM Thu 9:00 AM - 12:30 PM Fri 9:00 AM - 12:30 PM Sat 9:00 AM - 12:00 PM  Fee: Free        & SHELTER    Case Management      - Online housing search assistance  [address] [address] (Distance: 7.8 miles)  Phone: (139) 757-6883  Website: http://www.Health-Connectedlink.org/  Language: English, Kittitian, South Sudanese, Hmong  Accessibility: Ada accessible      Housing Online - https://Babyoye.Tray/  [address] [address] (Distance: 6.7 miles)  Website: https://Kaymu  Language: English      [employer] - Housing Stabilization Services  Phone: (104) 653-6308  Website: https://[employer].Tray/  Language: English  Hours: Mon 8:00 AM - 4:00 PM Tue 8:00 AM -  4:00 PM Wed 8:00 AM - 4:00 PM Thu 8:00 AM - 4:00 PM Fri 8:00 AM - 4:00 PM  Fee: Free  Accessibility: Blind accommodation, Deaf or hard of hearing  Transportation Options: Free transportation    Payment Assistance      [facility] [place] assistance  [address] [place] [address] (Distance: 3.0 miles)  Phone: (254) 524-9855  Language: English, Togolese, American, Hmong  Fee: Free  Accessibility: Ada accessible, Translation services      [place] - [place] - Security Deposit Assistance  [address] [place] [address] (Distance: 3.0 miles)  Phone: (657) 239-1527  Language: English  Fee: Free      [employer] - Keep the Key  Phone: (658) 123-4280  Website: https://www.wnz02-xvmoxbrvldjrdm.org/programs.html  Language: English  Hours: Mon 7:00 AM - 7:00 PM Tue 7:00 AM - 7:00 PM Wed 7:00 AM - 7:00 PM Thu 7:00 AM - 7:00 PM Fri 7:00 AM - 7:00 PM  Fee: Free    Mediation & Eviction Prevention      [facility] Humanity - Mortgage Foreclosure Prevention  [address] [place] [address] (Distance: 2.5 miles)  Phone: (838) 529-9033  Language: English  Fee: Free  Accessibility: Ada accessible      [employer] - Foreclosure Prevention  [address] [place] [address] (Distance: 2.1 miles)  Phone: (904) 592-6141  Website: http://www.[facility] Hospital[facility].org  Language: English, Togolese, Hmong  Fee: Free, Sliding scale  Accessibility: Blind accommodation, Ada accessible      Line - Tenant Rights / Eviction Prevention  Website: https://homelinemn.org/q-nnrt-hb-/  Language: English, Togolese               IMPORTANT NUMBERS & WEBSITES        Emergency Services  911  .   United Way  211 http://211unitedway.org  .   Poison Control  (942) 925-7579 http://[place]poison.org http://wisconsinpoison.org  .     Suicide and Crisis Lifeline  988 http://988HealthSouth Medical Centerline.org  .   Childhelp [place] Child Abuse Hotline  385.839.9229 http://Childhelphotline.org   .   National Sexual Assault  Hotline  (240) 506-9478 (HOPE) http://Yellowsmithn.DoesThatMakeSense.com   .     National Runaway Safeline  (795) 887-1243 (RUNAWAY) http://[place].DoesThatMakeSense.com  .   Pregnancy & Postpartum Support  Call/text 862-708-6754  [place]: http://ppsupportmn.org  [place]: http://psichapters.com/wi  .   Substance Abuse National Helpline ([facility])  084-355-HELP (9934) http://Findtreatment.gov   .                DISCLAIMER: These resources have been generated via the [employer] Platform. [employer] does not endorse any service providers mentioned in this resource list. [employer] does not guarantee that the services mentioned in this resource list will be available to you or will improve your health or wellness.    RUST

## 2024-04-12 NOTE — PROGRESS NOTES
"Preventive Care Visit  [facility] Deirdre E. Milligan, MD, Internal Medicine - Pediatrics  Apr 12, 2024      Assessment & Plan     Routine general medical examination at a health care facility  - HEMOGLOBIN A1C; Future  - Albumin Random Urine Quantitative with Creat Ratio; Future  - Lipid panel reflex to direct LDL Non-fasting; Future  - Creatinine; Future  - HEMOGLOBIN A1C  - Albumin Random Urine Quantitative with Creat Ratio  - Lipid panel reflex to direct LDL Non-fasting  - Creatinine    Controlled type 2 diabetes mellitus without complication, without long-term current use of insulin (H)  If A1c elevated consider increase in ozempic dose.  - HEMOGLOBIN A1C; Future  - Albumin Random Urine Quantitative with Creat Ratio; Future  - Lipid panel reflex to direct LDL Non-fasting; Future  - HEMOGLOBIN A1C  - Albumin Random Urine Quantitative with Creat Ratio  - Lipid panel reflex to direct LDL Non-fasting    Morbid obesity (H)  Complicated by hypertension and diabetes mellitus. Patient has lost 10+ lbs and is working on healthy eating and exercise.    Essential hypertension  Blood pressure well controlled on lisinopril BID.    Adjustment disorder with disturbance of conduct  Continue Lexapro (escitalopram) 10mg daily.              BMI  Estimated body mass index is 39.06 kg/m  as calculated from the following:    Height as of this encounter: 1.768 m (5' 9.61\").    Weight as of this encounter: 122.1 kg (269 lb 3.2 oz).       Counseling  Appropriate preventive services were discussed with this patient, including applicable screening as appropriate for fall prevention, nutrition, physical activity, Tobacco-use cessation, weight loss and cognition.  Checklist reviewing preventive services available has been given to the patient.  Reviewed patient's diet, addressing concerns and/or questions.   He is at risk for lack of exercise and has been provided with information to increase physical activity for the " benefit of his well-being.   Patient is at risk for social isolation and has been provided with information about the benefit of social connection.           Kathya Calvert is a 58 year old, presenting for the following:  Physical        4/12/2024     2:32 PM   Additional Questions   Roomed by S         4/12/2024     2:25 PM   Patient Reported Additional Medications   Patient reports taking the following new medications none        Via the Health Maintenance questionnaire, the patient has reported the following services have been completed -Eye Exam, this information has been sent to the abstraction team.  Health Care Directive  Patient does not have a Health Care Directive or Living Will: Discussed advance care planning with patient; information given to patient to review.    History of Present Illness       Reason for visit:  Yearly physical    He eats 0-1 servings of fruits and vegetables daily.He consumes 0 sweetened beverage(s) daily.He exercises with enough effort to increase his heart rate 30 to 60 minutes per day.  He exercises with enough effort to increase his heart rate 3 or less days per week.   He is taking medications regularly.    Blood pressure is well controlled at home    Blood sugars: avg 100-113    Working on losing weight   -working on exercise                4/11/2024   General Health   How would you rate your overall physical health? Good   Feel stress (tense, anxious, or unable to sleep) To some extent   (!) STRESS CONCERN      4/11/2024   Nutrition   Three or more servings of calcium each day? Yes   Diet: Diabetic   How many servings of fruit and vegetables per day? (!) 0-1   How many sweetened beverages each day? 0-1         4/11/2024   Exercise   Days per week of moderate/strenous exercise 3 days   Average minutes spent exercising at this level 30 min         4/11/2024   Social Factors   Frequency of gathering with friends or relatives Never   Worry food won't last until get money to  buy more Yes   Food not last or not have enough money for food? Yes   Do you have housing?  Yes   Are you worried about losing your housing? Yes   Lack of transportation? No   Unable to get utilities (heat,electricity)? No   Want help with housing or utility concern? (!) YES   (!) FOOD SECURITY CONCERN PRESENT(!) HOUSING CONCERN PRESENT(!) SOCIAL CONNECTIONS CONCERN      4/12/2024   Fall Risk   Fallen 2 or more times in the past year? Yes   Trouble with walking or balance? No   Gait Speed Test (Document in seconds) 4.2   Gait Speed Test Interpretation Less than or equal to 5.00 seconds - PASS          4/11/2024   Dental   Dentist two times every year? Yes         4/11/2024   TB Screening   Were you born outside of the [place]? No         Today's PHQ-2 Score:       4/12/2024     2:37 PM   PHQ-2 ( 1999 [employer])   Q1: Little interest or pleasure in doing things 1   Q2: Feeling down, depressed or hopeless 0   PHQ-2 Score 1   Q1: Little interest or pleasure in doing things Several days   Q2: Feeling down, depressed or hopeless Not at all   PHQ-2 Score 1           4/11/2024   Substance Use   Alcohol more than 3/day or more than 7/wk No   Do you use any other substances recreationally? No     Social History     Tobacco Use    Smoking status: Never     Passive exposure: Never    Smokeless tobacco: Never   Vaping Use    Vaping status: Never Used   Substance Use Topics    Alcohol use: Not Currently    Drug use: Never           4/11/2024   STI Screening   New sexual partner(s) since last STI/HIV test? (!) DECLINE   Last PSA:   Prostate Specific Antigen Screen   Date Value Ref Range Status   05/06/2023 0.75 0.00 - 3.50 ng/mL Final   08/07/2020 0.9 0.0 - 3.5 ng/mL Final     ASCVD Risk   The 10-year ASCVD risk score (Lashonda PANG, et al., 2019) is: 15.3%    Values used to calculate the score:      Age: 58 years      Sex: Male      Is Non- : No      Diabetic: Yes      Tobacco smoker: No      Systolic Blood  "Pressure: 122 mmHg      Is BP treated: Yes      HDL Cholesterol: 33 mg/dL      Total Cholesterol: 152 mg/dL           Reviewed and updated as needed this visit by Provider                             Objective    Exam  /75 (BP Location: Right arm, Patient Position: Sitting, Cuff Size: Adult Large)   Pulse 69   Temp 98.2  F (36.8  C) (Tympanic)   Resp 16   Ht 1.768 m (5' 9.61\")   Wt 122.1 kg (269 lb 3.2 oz)   SpO2 97%   BMI 39.06 kg/m     Estimated body mass index is 39.06 kg/m  as calculated from the following:    Height as of this encounter: 1.768 m (5' 9.61\").    Weight as of this encounter: 122.1 kg (269 lb 3.2 oz).    Physical Exam  GENERAL: alert and no distress  EYES: Eyes grossly normal to inspection, and conjunctivae and sclerae normal  HENT: ear canals and TM's normal, nose and mouth without ulcers or lesions  NECK: no adenopathy, no asymmetry, masses, or scars  RESP: lungs clear to auscultation - no rales, rhonchi or wheezes  CV: regular rate and rhythm, normal S1 S2, no S3 or S4, no murmur, click or rub, no peripheral edema  ABDOMEN: soft, nontender, no hepatosplenomegaly, no masses  MS: no gross musculoskeletal defects noted, no edema  SKIN: no suspicious lesions or rashes  NEURO: Normal strength and tone, mentation intact and speech normal  PSYCH: mentation appears normal, affect normal/bright        Signed Electronically by: Deirdre E. Milligan, MD    "

## 2024-04-13 LAB
CREAT UR-MCNC: 131 MG/DL
MICROALBUMIN UR-MCNC: <12 MG/L
MICROALBUMIN/CREAT UR: NORMAL MG/G{CREAT}

## 2024-04-30 DIAGNOSIS — E11.9 CONTROLLED TYPE 2 DIABETES MELLITUS WITHOUT COMPLICATION, WITHOUT LONG-TERM CURRENT USE OF INSULIN (H): ICD-10-CM

## 2024-04-30 DIAGNOSIS — E66.01 MORBID OBESITY (H): ICD-10-CM

## 2024-05-01 DIAGNOSIS — E78.5 HYPERLIPIDEMIA, UNSPECIFIED HYPERLIPIDEMIA TYPE: ICD-10-CM

## 2024-05-01 RX ORDER — SIMVASTATIN 40 MG
40 TABLET ORAL AT BEDTIME
Qty: 90 TABLET | Refills: 2 | Status: SHIPPED | OUTPATIENT
Start: 2024-05-01

## 2024-05-02 RX ORDER — SEMAGLUTIDE 1.34 MG/ML
INJECTION, SOLUTION SUBCUTANEOUS
Qty: 9 ML | Refills: 4 | Status: SHIPPED | OUTPATIENT
Start: 2024-05-02

## 2024-05-02 NOTE — TELEPHONE ENCOUNTER
A1c down, continue same dose.    Deirdre Milligan, MD  Internal Medicine & Pediatrics  [facility]  She/her

## 2024-06-14 DIAGNOSIS — I10 ESSENTIAL HYPERTENSION: ICD-10-CM

## 2024-06-14 RX ORDER — LISINOPRIL 20 MG/1
20 TABLET ORAL 2 TIMES DAILY
Qty: 180 TABLET | Refills: 2 | Status: SHIPPED | OUTPATIENT
Start: 2024-06-14

## 2024-07-21 ENCOUNTER — HEALTH MAINTENANCE LETTER (OUTPATIENT)
Age: 59
End: 2024-07-21

## 2024-08-03 ENCOUNTER — MYC MEDICAL ADVICE (OUTPATIENT)
Dept: PEDIATRICS | Facility: CLINIC | Age: 59
End: 2024-08-03
Payer: COMMERCIAL

## 2024-08-03 DIAGNOSIS — E11.9 CONTROLLED TYPE 2 DIABETES MELLITUS WITHOUT COMPLICATION, WITHOUT LONG-TERM CURRENT USE OF INSULIN (H): Primary | ICD-10-CM

## 2024-08-10 ENCOUNTER — LAB (OUTPATIENT)
Dept: LAB | Facility: CLINIC | Age: 59
End: 2024-08-10
Payer: COMMERCIAL

## 2024-08-10 DIAGNOSIS — E11.9 CONTROLLED TYPE 2 DIABETES MELLITUS WITHOUT COMPLICATION, WITHOUT LONG-TERM CURRENT USE OF INSULIN (H): ICD-10-CM

## 2024-08-10 DIAGNOSIS — I10 ESSENTIAL HYPERTENSION: ICD-10-CM

## 2024-08-10 LAB
ALBUMIN SERPL BCG-MCNC: 4.3 G/DL (ref 3.5–5.2)
ALP SERPL-CCNC: 54 U/L (ref 40–150)
ALT SERPL W P-5'-P-CCNC: 13 U/L (ref 0–70)
ANION GAP SERPL CALCULATED.3IONS-SCNC: 9 MMOL/L (ref 7–15)
AST SERPL W P-5'-P-CCNC: 21 U/L (ref 0–45)
BILIRUB SERPL-MCNC: 0.3 MG/DL
BUN SERPL-MCNC: 11.5 MG/DL (ref 8–23)
CALCIUM SERPL-MCNC: 9.3 MG/DL (ref 8.8–10.4)
CHLORIDE SERPL-SCNC: 101 MMOL/L (ref 98–107)
CREAT SERPL-MCNC: 1.22 MG/DL (ref 0.67–1.17)
EGFRCR SERPLBLD CKD-EPI 2021: 68 ML/MIN/1.73M2
GLUCOSE SERPL-MCNC: 79 MG/DL (ref 70–99)
HBA1C MFR BLD: 6.1 % (ref 0–5.6)
HCO3 SERPL-SCNC: 29 MMOL/L (ref 22–29)
POTASSIUM SERPL-SCNC: 4.7 MMOL/L (ref 3.4–5.3)
PROT SERPL-MCNC: 7.3 G/DL (ref 6.4–8.3)
SODIUM SERPL-SCNC: 139 MMOL/L (ref 135–145)

## 2024-08-10 PROCEDURE — 36415 COLL VENOUS BLD VENIPUNCTURE: CPT

## 2024-08-10 PROCEDURE — 83036 HEMOGLOBIN GLYCOSYLATED A1C: CPT

## 2024-08-10 PROCEDURE — 80053 COMPREHEN METABOLIC PANEL: CPT

## 2024-10-05 ENCOUNTER — IMMUNIZATION (OUTPATIENT)
Dept: PEDIATRICS | Facility: CLINIC | Age: 59
End: 2024-10-05
Payer: COMMERCIAL

## 2024-10-05 PROCEDURE — 90471 IMMUNIZATION ADMIN: CPT

## 2024-10-05 PROCEDURE — 90673 RIV3 VACCINE NO PRESERV IM: CPT

## 2024-10-26 ENCOUNTER — IMMUNIZATION (OUTPATIENT)
Dept: FAMILY MEDICINE | Facility: CLINIC | Age: 59
End: 2024-10-26
Payer: COMMERCIAL

## 2024-10-26 PROCEDURE — 90480 ADMN SARSCOV2 VAC 1/ONLY CMP: CPT

## 2024-10-26 PROCEDURE — 91320 SARSCV2 VAC 30MCG TRS-SUC IM: CPT

## 2024-11-22 ENCOUNTER — OFFICE VISIT (OUTPATIENT)
Dept: PEDIATRICS | Facility: CLINIC | Age: 59
End: 2024-11-22
Attending: STUDENT IN AN ORGANIZED HEALTH CARE EDUCATION/TRAINING PROGRAM
Payer: COMMERCIAL

## 2024-11-22 VITALS
TEMPERATURE: 98.5 F | RESPIRATION RATE: 18 BRPM | WEIGHT: 279.4 LBS | DIASTOLIC BLOOD PRESSURE: 76 MMHG | HEIGHT: 70 IN | SYSTOLIC BLOOD PRESSURE: 125 MMHG | OXYGEN SATURATION: 98 % | HEART RATE: 68 BPM | BODY MASS INDEX: 40 KG/M2

## 2024-11-22 DIAGNOSIS — E11.9 CONTROLLED TYPE 2 DIABETES MELLITUS WITHOUT COMPLICATION, WITHOUT LONG-TERM CURRENT USE OF INSULIN (H): Primary | ICD-10-CM

## 2024-11-22 LAB
EST. AVERAGE GLUCOSE BLD GHB EST-MCNC: 137 MG/DL
HBA1C MFR BLD: 6.4 % (ref 0–5.6)

## 2024-11-22 PROCEDURE — 36415 COLL VENOUS BLD VENIPUNCTURE: CPT | Performed by: STUDENT IN AN ORGANIZED HEALTH CARE EDUCATION/TRAINING PROGRAM

## 2024-11-22 PROCEDURE — 83036 HEMOGLOBIN GLYCOSYLATED A1C: CPT | Performed by: STUDENT IN AN ORGANIZED HEALTH CARE EDUCATION/TRAINING PROGRAM

## 2024-11-22 PROCEDURE — G2211 COMPLEX E/M VISIT ADD ON: HCPCS | Performed by: STUDENT IN AN ORGANIZED HEALTH CARE EDUCATION/TRAINING PROGRAM

## 2024-11-22 PROCEDURE — 99207 PR FOOT EXAM NO CHARGE: CPT | Performed by: STUDENT IN AN ORGANIZED HEALTH CARE EDUCATION/TRAINING PROGRAM

## 2024-11-22 PROCEDURE — 99213 OFFICE O/P EST LOW 20 MIN: CPT | Performed by: STUDENT IN AN ORGANIZED HEALTH CARE EDUCATION/TRAINING PROGRAM

## 2024-11-22 ASSESSMENT — PAIN SCALES - GENERAL: PAINLEVEL_OUTOF10: NO PAIN (0)

## 2024-11-22 NOTE — PATIENT INSTRUCTIONS
Living with diabetes is hard work every day.  We recognize this. Our efforts are to help you reach and maintain the following goals:    HbA1c completed every 6 months, if your result is greater than 7.0 we would like this test completed every 3 months.  HbA1c level of less than 8  LDL (bad cholesterol) completed yearly  LDL level less than 100  Microalbumin completed yearly, with a level less than 25.  Maintain a blood pressure of less than 130/80  Yearly eye exam.    ASSESSMENT: at this time:  DIABETES Type II:                      Control   good     Lab Results   Component Value Date    A1C 6.1 08/10/2024               HTN:                                              Control   good   Last BP: 125/76          HYPERCHOLESTEROLEMIA:   Control   good     Lab Results   Component Value Date    LDL 59 04/12/2024            PLAN:  Continue current medications

## 2024-11-22 NOTE — PROGRESS NOTES
"  Assessment & Plan     Controlled type 2 diabetes mellitus without complication, without long-term current use of insulin (H)  Per patient report blood sugars have been good. If A1c >7% recommend increase ozempic to 2mg weekly. On ace-I, statin, metformin.  - HEMOGLOBIN A1C; Future  - PRIMARY CARE FOLLOW-UP SCHEDULING; Future  - HEMOGLOBIN A1C  - FOOT EXAM    Follow up for physical in ~6 months.    The longitudinal plan of care for the diagnosis(es)/condition(s) as documented were addressed during this visit. Due to the added complexity in care, I will continue to support Enrike in the subsequent management and with ongoing continuity of care.    BMI  Estimated body mass index is 40.55 kg/m  as calculated from the following:    Height as of this encounter: 1.768 m (5' 9.6\").    Weight as of this encounter: 126.7 kg (279 lb 6.4 oz).             Kathya Calvert is a 59 year old, presenting for the following health issues:  Diabetes        11/22/2024     4:17 PM   Additional Questions   Roomed by Beverly Robles   Accompanied by N/A     History of Present Illness       Diabetes:   He presents for follow up of diabetes.  He is checking home blood glucose three times daily.   He checks blood glucose before and after meals.  Blood glucose is never over 200 and sometimes under 70.  When his blood glucose is low, the patient is asymptomatic for confusion, blurred vision, lethargy and reports not feeling dizzy, shaky, or weak.   He has no concerns regarding his diabetes at this time.   He is not experiencing numbness or burning in feet, excessive thirst, blurry vision, weight changes or redness, sores or blisters on feet.           Hypertension: He presents for follow up of hypertension.  He does check blood pressure  regularly outside of the clinic. Outside blood pressures have been over 140/90. He does not follow a low salt diet.     He eats 0-1 servings of fruits and vegetables daily.He consumes 1 sweetened beverage(s) " "daily.He exercises with enough effort to increase his heart rate 10 to 19 minutes per day.  He exercises with enough effort to increase his heart rate 3 or less days per week.   He is taking medications regularly.       Wt Readings from Last 4 Encounters:   11/22/24 126.7 kg (279 lb 6.4 oz)   04/12/24 122.1 kg (269 lb 3.2 oz)   12/30/23 127 kg (280 lb)   10/09/23 126.3 kg (278 lb 6 oz)     Exercise: heavy lifting at work                      Objective    /76 (BP Location: Right arm, Patient Position: Sitting, Cuff Size: Adult Large)   Pulse 68   Temp 98.5  F (36.9  C) (Temporal)   Resp 18   Ht 1.768 m (5' 9.6\")   Wt 126.7 kg (279 lb 6.4 oz)   SpO2 98%   BMI 40.55 kg/m    Body mass index is 40.55 kg/m .  Physical Exam   GENERAL: alert and no distress  Diabetic foot exam: normal DP and PT pulses, no trophic changes or ulcerative lesions, and normal sensory exam            Signed Electronically by: Deirdre E. Milligan, MD    "

## 2025-01-02 ENCOUNTER — TRANSFERRED RECORDS (OUTPATIENT)
Dept: MULTI SPECIALTY CLINIC | Facility: CLINIC | Age: 60
End: 2025-01-02

## 2025-01-02 LAB — RETINOPATHY: NORMAL

## 2025-01-31 ENCOUNTER — TELEPHONE (OUTPATIENT)
Dept: PEDIATRICS | Facility: CLINIC | Age: 60
End: 2025-01-31

## 2025-01-31 NOTE — TELEPHONE ENCOUNTER
Prior Authorization Retail Medication Request    Medication/Dose: Ozempic 1mg/dose  Diagnosis and ICD code (if different than what is on RX):    New/renewal/insurance change PA/secondary ins. PA:  Previously Tried and Failed:    Rationale:      Insurance   Primary: [employer]  Insurance ID:  [id number]    Secondary (if applicable):  Insurance ID:      Pharmacy Information (if different than what is on RX)  Name:  [facility]  Phone:  [phone]  Fax:    Clinic Information  Preferred routing pool for dept communication:     Prior authorization required. Previous PA .    Thank you,  Johanne Zelaya CPhT  [facility]

## 2025-02-05 NOTE — TELEPHONE ENCOUNTER
Retail Pharmacy Prior Authorization Team   Phone: [phone]    PA Initiation    Medication: OZEMPIC (1 MG/DOSE) 4 MG/3ML SC SOPN  Insurance Company: [employer] Non-Specialty PA's - Phone [phone] Fax [phone]  Pharmacy Filling the Rx: [facility] - [address]   Filling Pharmacy Phone: [phone]  Filling Pharmacy Fax:    Start Date: 2/5/2025    [id number]

## 2025-02-06 NOTE — TELEPHONE ENCOUNTER
Prior Authorization Approval    Medication: OZEMPIC (1 MG/DOSE) 4 MG/3ML SC SOPN  Authorization Effective Date: 1/7/2025  Authorization Expiration Date: 2/6/2026  Approved Dose/Quantity:   Reference #:     Insurance Company: [employer] Non-Specialty PA's - Phone [phone] Fax [phone]  Expected CoPay: $    CoPay Card Available:      Financial Assistance Needed:   Which Pharmacy is filling the prescription: [facility] - [id number]   Pharmacy Notified: Yes  Patient Notified: **Instructed pharmacy to notify patient when script is ready to /ship.**           normal... Unresponsive

## 2025-03-13 ENCOUNTER — PATIENT OUTREACH (OUTPATIENT)
Dept: CARE COORDINATION | Facility: CLINIC | Age: 60
End: 2025-03-13
Payer: COMMERCIAL

## 2025-03-15 ENCOUNTER — HEALTH MAINTENANCE LETTER (OUTPATIENT)
Age: 60
End: 2025-03-15

## 2025-03-21 ENCOUNTER — TELEPHONE (OUTPATIENT)
Dept: PEDIATRICS | Facility: CLINIC | Age: 60
End: 2025-03-21
Payer: COMMERCIAL

## 2025-03-21 DIAGNOSIS — Z12.5 SCREENING FOR PROSTATE CANCER: Primary | ICD-10-CM

## 2025-03-21 DIAGNOSIS — E11.9 CONTROLLED TYPE 2 DIABETES MELLITUS WITHOUT COMPLICATION, WITHOUT LONG-TERM CURRENT USE OF INSULIN (H): ICD-10-CM

## 2025-03-21 NOTE — TELEPHONE ENCOUNTER
Note:  Patient has an appt on 4/14/25 and would like an order put in for labs prior to the appt.  Enrike is looking for A1C and any other labs necessary for the 4/14/25 visit.  Please leave a message on [employer] for Enrike when the labs have been ordered.

## 2025-03-24 NOTE — TELEPHONE ENCOUNTER
Spoke to patient. He will schedule lab through my chart. Fully understood that additional labs may be needed at the time of appointment.     Ede-

## 2025-03-24 NOTE — TELEPHONE ENCOUNTER
Labs ordered based on chart. Since I have not met patient others may be needed at time of visit.   Carisa Mobley PA-C

## 2025-04-09 SDOH — HEALTH STABILITY: PHYSICAL HEALTH: ON AVERAGE, HOW MANY DAYS PER WEEK DO YOU ENGAGE IN MODERATE TO STRENUOUS EXERCISE (LIKE A BRISK WALK)?: 0 DAYS

## 2025-04-09 SDOH — HEALTH STABILITY: PHYSICAL HEALTH: ON AVERAGE, HOW MANY MINUTES DO YOU ENGAGE IN EXERCISE AT THIS LEVEL?: 0 MIN

## 2025-04-09 ASSESSMENT — SOCIAL DETERMINANTS OF HEALTH (SDOH): HOW OFTEN DO YOU GET TOGETHER WITH FRIENDS OR RELATIVES?: NEVER

## 2025-04-12 ENCOUNTER — LAB (OUTPATIENT)
Dept: LAB | Facility: CLINIC | Age: 60
End: 2025-04-12
Payer: COMMERCIAL

## 2025-04-12 DIAGNOSIS — Z12.5 SCREENING FOR PROSTATE CANCER: ICD-10-CM

## 2025-04-12 DIAGNOSIS — E11.9 CONTROLLED TYPE 2 DIABETES MELLITUS WITHOUT COMPLICATION, WITHOUT LONG-TERM CURRENT USE OF INSULIN (H): ICD-10-CM

## 2025-04-12 LAB
ALBUMIN SERPL BCG-MCNC: 4.2 G/DL (ref 3.5–5.2)
ALP SERPL-CCNC: 70 U/L (ref 40–150)
ALT SERPL W P-5'-P-CCNC: 16 U/L (ref 0–70)
ANION GAP SERPL CALCULATED.3IONS-SCNC: 8 MMOL/L (ref 7–15)
AST SERPL W P-5'-P-CCNC: 28 U/L (ref 0–45)
BILIRUB SERPL-MCNC: 0.3 MG/DL
BUN SERPL-MCNC: 12.8 MG/DL (ref 8–23)
CALCIUM SERPL-MCNC: 9.2 MG/DL (ref 8.8–10.4)
CHLORIDE SERPL-SCNC: 99 MMOL/L (ref 98–107)
CHOLEST SERPL-MCNC: 164 MG/DL
CREAT SERPL-MCNC: 1.1 MG/DL (ref 0.67–1.17)
CREAT UR-MCNC: 53.4 MG/DL
EGFRCR SERPLBLD CKD-EPI 2021: 77 ML/MIN/1.73M2
EST. AVERAGE GLUCOSE BLD GHB EST-MCNC: 131 MG/DL
FASTING STATUS PATIENT QL REPORTED: YES
FASTING STATUS PATIENT QL REPORTED: YES
GLUCOSE SERPL-MCNC: 124 MG/DL (ref 70–99)
HBA1C MFR BLD: 6.2 % (ref 0–5.6)
HCO3 SERPL-SCNC: 29 MMOL/L (ref 22–29)
HDLC SERPL-MCNC: 33 MG/DL
LDLC SERPL CALC-MCNC: 107 MG/DL
MICROALBUMIN UR-MCNC: <12 MG/L
MICROALBUMIN/CREAT UR: NORMAL MG/G{CREAT}
NONHDLC SERPL-MCNC: 131 MG/DL
POTASSIUM SERPL-SCNC: 4.6 MMOL/L (ref 3.4–5.3)
PROT SERPL-MCNC: 7 G/DL (ref 6.4–8.3)
PSA SERPL DL<=0.01 NG/ML-MCNC: 1.39 NG/ML (ref 0–3.5)
SODIUM SERPL-SCNC: 136 MMOL/L (ref 135–145)
TRIGL SERPL-MCNC: 121 MG/DL

## 2025-04-12 PROCEDURE — 82043 UR ALBUMIN QUANTITATIVE: CPT

## 2025-04-12 PROCEDURE — 36415 COLL VENOUS BLD VENIPUNCTURE: CPT

## 2025-04-12 PROCEDURE — G0103 PSA SCREENING: HCPCS

## 2025-04-12 PROCEDURE — 80053 COMPREHEN METABOLIC PANEL: CPT

## 2025-04-12 PROCEDURE — 83036 HEMOGLOBIN GLYCOSYLATED A1C: CPT

## 2025-04-12 PROCEDURE — 80061 LIPID PANEL: CPT

## 2025-04-12 PROCEDURE — 82570 ASSAY OF URINE CREATININE: CPT

## 2025-04-14 ENCOUNTER — OFFICE VISIT (OUTPATIENT)
Dept: FAMILY MEDICINE | Facility: CLINIC | Age: 60
End: 2025-04-14
Payer: COMMERCIAL

## 2025-04-14 VITALS
OXYGEN SATURATION: 98 % | HEIGHT: 70 IN | BODY MASS INDEX: 38.42 KG/M2 | SYSTOLIC BLOOD PRESSURE: 151 MMHG | WEIGHT: 268.4 LBS | TEMPERATURE: 98.7 F | RESPIRATION RATE: 16 BRPM | HEART RATE: 64 BPM | DIASTOLIC BLOOD PRESSURE: 91 MMHG

## 2025-04-14 DIAGNOSIS — Z00.00 ROUTINE GENERAL MEDICAL EXAMINATION AT A HEALTH CARE FACILITY: ICD-10-CM

## 2025-04-14 DIAGNOSIS — F43.24 ADJUSTMENT DISORDER WITH DISTURBANCE OF CONDUCT: ICD-10-CM

## 2025-04-14 DIAGNOSIS — I10 ESSENTIAL HYPERTENSION: ICD-10-CM

## 2025-04-14 DIAGNOSIS — E11.9 CONTROLLED TYPE 2 DIABETES MELLITUS WITHOUT COMPLICATION, WITHOUT LONG-TERM CURRENT USE OF INSULIN (H): Primary | ICD-10-CM

## 2025-04-14 DIAGNOSIS — E78.5 HYPERLIPIDEMIA, UNSPECIFIED HYPERLIPIDEMIA TYPE: ICD-10-CM

## 2025-04-14 DIAGNOSIS — E66.01 MORBID OBESITY (H): ICD-10-CM

## 2025-04-14 PROCEDURE — 99396 PREV VISIT EST AGE 40-64: CPT | Performed by: PHYSICIAN ASSISTANT

## 2025-04-14 PROCEDURE — 99214 OFFICE O/P EST MOD 30 MIN: CPT | Mod: 25 | Performed by: PHYSICIAN ASSISTANT

## 2025-04-14 PROCEDURE — G2211 COMPLEX E/M VISIT ADD ON: HCPCS | Performed by: PHYSICIAN ASSISTANT

## 2025-04-14 RX ORDER — LISINOPRIL 20 MG/1
20 TABLET ORAL 2 TIMES DAILY
Qty: 180 TABLET | Refills: 2 | Status: SHIPPED | OUTPATIENT
Start: 2025-04-14

## 2025-04-14 RX ORDER — ESCITALOPRAM OXALATE 10 MG/1
10 TABLET ORAL DAILY
Qty: 90 TABLET | Refills: 3 | Status: SHIPPED | OUTPATIENT
Start: 2025-04-14

## 2025-04-14 RX ORDER — SIMVASTATIN 40 MG
40 TABLET ORAL AT BEDTIME
Qty: 90 TABLET | Refills: 2 | Status: SHIPPED | OUTPATIENT
Start: 2025-04-14

## 2025-04-14 RX ORDER — SEMAGLUTIDE 1.34 MG/ML
1 INJECTION, SOLUTION SUBCUTANEOUS
Qty: 9 ML | Refills: 4 | Status: SHIPPED | OUTPATIENT
Start: 2025-04-14

## 2025-04-14 NOTE — PROGRESS NOTES
Preventive Care Visit  [facility] REAL Mobley PA-C, Family Medicine  Apr 14, 2025      Assessment & Plan     Controlled type 2 diabetes mellitus without complication, without long-term current use of insulin (H)  Has been off medications for 2 months. A1C shows good control, but patient had benefits from GLP1. Will have him restart at the 0.5mg dose and then increase to the 1mg dose due to length of time off medication. Labs in 3 months.   - metFORMIN (GLUCOPHAGE) 1000 MG tablet; Take 1 tablet (1,000 mg) by mouth 2 times daily (with meals).  - Semaglutide, 1 MG/DOSE, (OZEMPIC, 1 MG/DOSE,) 4 MG/3ML pen; Inject 1 mg subcutaneously every 7 days.  - Lipid panel reflex to direct LDL Fasting; Future  - Hemoglobin A1c; Future  - semaglutide (OZEMPIC) 2 MG/3ML pen; Inject 0.5 mg subcutaneously every 7 days.    Routine general medical examination at a health care facility  Adjustment disorder with disturbance of conduct  Not controlled. Will restart lexapro, reach out if not effective like in the past.   - escitalopram (LEXAPRO) 10 MG tablet; Take 1 tablet (10 mg) by mouth daily.    Essential hypertension  Restart, blood pressure check in 3-6 weeks.   - lisinopril (ZESTRIL) 20 MG tablet; Take 1 tablet (20 mg) by mouth 2 times daily.    Morbid obesity (H)  Patient would benefit from weight loss to help control DM.   - Semaglutide, 1 MG/DOSE, (OZEMPIC, 1 MG/DOSE,) 4 MG/3ML pen; Inject 1 mg subcutaneously every 7 days.    Hyperlipidemia, unspecified hyperlipidemia type  Not controlled. Restart simvastatin.   - simvastatin (ZOCOR) 40 MG tablet; Take 1 tablet (40 mg) by mouth at bedtime.        The longitudinal plan of care for the diagnosis(es)/condition(s) as documented were addressed during this visit. Due to the added complexity in care, I will continue to support Enrike in the subsequent management and with ongoing continuity of care.    BMI  Estimated body mass index is 38.51 kg/m  as calculated  "from the following:    Height as of this encounter: 1.778 m (5' 10\").    Weight as of this encounter: 121.7 kg (268 lb 6.4 oz).       Counseling  Appropriate preventive services were addressed with this patient via screening, questionnaire, or discussion as appropriate for fall prevention, nutrition, physical activity, Tobacco-use cessation, social engagement, weight loss and cognition.  Checklist reviewing preventive services available has been given to the patient.  Reviewed patient's diet, addressing concerns and/or questions.   Patient is at risk for social isolation and has been provided with information about the benefit of social connection.   The patient was instructed to see the dentist every 6 months.   He is at risk for psychosocial distress and has been provided with information to reduce risk.           Kathya Calvert is a 59 year old, presenting for the following:  Physical        4/14/2025     4:38 PM   Additional Questions   Roomed by Rosa        Via the Health Maintenance questionnaire, the patient has reported the following services have been completed -Eye Exam: [facility]  [place] 2025-01-02, this information has been sent to the abstraction team.    HPI  Has been out of medications for a few months. Had switched jobs.   Mood not great without the lexapro. No suicidal thoughts. More depression/anxiety. When on the 10mg dose felt controlled.              Advance Care Planning  Patient does not have a Health Care Directive: Discussed advance care planning with patient; however, patient declined at this time.      4/9/2025   General Health   How would you rate your overall physical health? Good   Feel stress (tense, anxious, or unable to sleep) Rather much   (!) STRESS CONCERN      4/9/2025   Nutrition   Three or more servings of calcium each day? Yes   Diet: Diabetic   How many servings of fruit and vegetables per day? (!) 0-1   How many sweetened beverages each day? 0-1         4/9/2025 "   Exercise   Days per week of moderate/strenous exercise 0 days   Average minutes spent exercising at this level 0 min   (!) EXERCISE CONCERN      4/9/2025   Social Factors   Frequency of gathering with friends or relatives Never   Worry food won't last until get money to buy more Yes   Food not last or not have enough money for food? Yes   Do you have housing? (Housing is defined as stable permanent housing and does not include staying ouside in a car, in a tent, in an abandoned building, in an overnight shelter, or couch-surfing.) Yes   Are you worried about losing your housing? Yes   Lack of transportation? No   Unable to get utilities (heat,electricity)? No   Want help with housing or utility concern? No   (!) FOOD SECURITY CONCERN PRESENT(!) HOUSING CONCERN PRESENT(!) SOCIAL CONNECTIONS CONCERN      4/9/2025   Fall Risk   Fallen 2 or more times in the past year? No   Trouble with walking or balance? No          4/9/2025   Dental   Dentist two times every year? (!) NO                  Today's PHQ-2 Score:       1/31/2025     9:08 AM   PHQ-2 ( 1999 [employer])   Q1: Little interest or pleasure in doing things 0   Q2: Feeling down, depressed or hopeless 1   PHQ-2 Score 1    Q1: Little interest or pleasure in doing things Not at all   Q2: Feeling down, depressed or hopeless Several days   PHQ-2 Score 1       Patient-reported         4/9/2025   Substance Use   Alcohol more than 3/day or more than 7/wk Not Applicable   Do you use any other substances recreationally? No     Social History     Tobacco Use    Smoking status: Never     Passive exposure: Never    Smokeless tobacco: Never   Vaping Use    Vaping status: Never Used   Substance Use Topics    Alcohol use: Not Currently    Drug use: Never           4/9/2025   STI Screening   New sexual partner(s) since last STI/HIV test? No   Last PSA:   Prostate Specific Antigen Screen   Date Value Ref Range Status   04/12/2025 1.39 0.00 - 3.50 ng/mL Final   08/07/2020 0.9 0.0 -  "3.5 ng/mL Final     ASCVD Risk   The 10-year ASCVD risk score (Lashonda PANG, et al., 2019) is: 24.9%    Values used to calculate the score:      Age: 59 years      Sex: Male      Is Non- : No      Diabetic: Yes      Tobacco smoker: No      Systolic Blood Pressure: 151 mmHg      Is BP treated: Yes      HDL Cholesterol: 33 mg/dL      Total Cholesterol: 164 mg/dL           Reviewed and updated as needed this visit by Provider   Tobacco  Allergies  Meds  Problems  Med Hx  Surg Hx  Fam Hx                     Objective    Exam  BP (!) 151/91 (BP Location: Left arm, Patient Position: Sitting, Cuff Size: Adult Large)   Pulse 64   Temp 98.7  F (37.1  C) (Oral)   Resp 16   Ht 1.778 m (5' 10\")   Wt 121.7 kg (268 lb 6.4 oz)   SpO2 98%   BMI 38.51 kg/m     Estimated body mass index is 38.51 kg/m  as calculated from the following:    Height as of this encounter: 1.778 m (5' 10\").    Weight as of this encounter: 121.7 kg (268 lb 6.4 oz).    Physical Exam  GENERAL: alert and no distress  EYES: Eyes grossly normal to inspection, PERRL and conjunctivae and sclerae normal  HENT: ear canals and TM's normal, nose and mouth without ulcers or lesions  NECK: no adenopathy, no asymmetry, masses, or scars  RESP: lungs clear to auscultation - no rales, rhonchi or wheezes  CV: regular rate and rhythm, normal S1 S2, no S3 or S4, no murmur, click or rub, no peripheral edema  ABDOMEN: soft, nontender, no hepatosplenomegaly, no masses   MS: no gross musculoskeletal defects noted, no edema  SKIN: no suspicious lesions or rashes  NEURO: Normal strength and tone, mentation intact and speech normal  PSYCH: mentation appears normal, affect normal/bright        Signed Electronically by: Carisa Mobley PA-C    "

## 2025-04-14 NOTE — PATIENT INSTRUCTIONS
Lab only appointment in 3 months.       Patient Education   Preventive Care Advice   This is general advice given by our system to help you stay healthy. However, your care team may have specific advice just for you. Please talk to your care team about your preventive care needs.  Nutrition  Eat 5 or more servings of fruits and vegetables each day.  Try wheat bread, brown rice and whole grain pasta (instead of white bread, rice, and pasta).  Get enough calcium and vitamin D. Check the label on foods and aim for 100% of the RDA (recommended daily allowance).  Lifestyle  Exercise at least 150 minutes each week  (30 minutes a day, 5 days a week).  Do muscle strengthening activities 2 days a week. These help control your weight and prevent disease.  No smoking.  Wear sunscreen to prevent skin cancer.  Have a dental exam and cleaning every 6 months.  Yearly exams  See your health care team every year to talk about:  Any changes in your health.  Any medicines your care team has prescribed.  Preventive care, family planning, and ways to prevent chronic diseases.  Shots (vaccines)   HPV shots (up to age 26), if you've never had them before.  Hepatitis B shots (up to age 59), if you've never had them before.  COVID-19 shot: Get this shot when it's due.  Flu shot: Get a flu shot every year.  Tetanus shot: Get a tetanus shot every 10 years.  Pneumococcal, hepatitis A, and RSV shots: Ask your care team if you need these based on your risk.  Shingles shot (for age 50 and up)  General health tests  Diabetes screening:  Starting at age 35, Get screened for diabetes at least every 3 years.  If you are younger than age 35, ask your care team if you should be screened for diabetes.  Cholesterol test: At age 39, start having a cholesterol test every 5 years, or more often if advised.  Bone density scan (DEXA): At age 50, ask your care team if you should have this scan for osteoporosis (brittle bones).  Hepatitis C: Get tested at  least once in your life.  STIs (sexually transmitted infections)  Before age 24: Ask your care team if you should be screened for STIs.  After age 24: Get screened for STIs if you're at risk. You are at risk for STIs (including HIV) if:  You are sexually active with more than one person.  You don't use condoms every time.  You or a partner was diagnosed with a sexually transmitted infection.  If you are at risk for HIV, ask about PrEP medicine to prevent HIV.  Get tested for HIV at least once in your life, whether you are at risk for HIV or not.  Cancer screening tests  Cervical cancer screening: If you have a cervix, begin getting regular cervical cancer screening tests starting at age 21.  Breast cancer scan (mammogram): If you've ever had breasts, begin having regular mammograms starting at age 40. This is a scan to check for breast cancer.  Colon cancer screening: It is important to start screening for colon cancer at age 45.  Have a colonoscopy test every 10 years (or more often if you're at risk) Or, ask your provider about stool tests like a FIT test every year or Cologuard test every 3 years.  To learn more about your testing options, visit:   .  For help making a decision, visit:   https://bit.ly/jn68766.  Prostate cancer screening test: If you have a prostate, ask your care team if a prostate cancer screening test (PSA) at age 55 is right for you.  Lung cancer screening: If you are a current or former smoker ages 50 to 80, ask your care team if ongoing lung cancer screenings are right for you.  For informational purposes only. Not to replace the advice of your health care provider. Copyright   2023 [facility]. All rights reserved. Clinically reviewed by the [facility] Transitions Program. [employer] 124848 - REV 01/24.  Relationships for Good Health  Relationships are important for our health and happiness. Social isolation, loneliness and lack of support are bad for your health.  Studies show that loneliness can harm health and limit your life span as much as high blood pressure and smoking.   Take some time to reflect on your relationships. Then answer these questions:  Are there people in your life that cause you stress or drain your energy? What can you do to set limits?  ________________________________________________________________________________________________________________________________________________________________________________________________________________________________________________________________________________________________________________________________________________  Who do you enjoy spending time with? Who can you go to for support?  ________________________________________________________________________________________________________________________________________________________________________________________________________________________________________________________________________________________________________________________________________________  What can you do to improve your relationships with others?  __________________________________________________________________________________________________________________________________________________________________________________________________________________  ______________________________________________________________________________________________________________________________  What do you like most about your relationships with others?  ________________________________________________________________________________________________________________________________________________________________________________________________________________________________________________________________________________________________________________________________________________  My goal: ______________________________________________________________________  I will:  ______________________________________________________________________________________________________________________________________________________________________________________________    For informational purposes only. Not to replace the advice of your health care provider. Copyright   2018 [facility]. All rights reserved. Clinically reviewed by [facility]  Team. [employer] [id number] - Rev 06/24.  Learning About Stress  What is stress?     Stress is your body's response to a hard situation. Your body can have a physical, emotional, or mental response. Stress is a fact of life for most people, and it affects everyone differently. What causes stress for you may not be stressful for someone else.  A lot of things can cause stress. You may feel stress when you go on a job interview, take a test, or run a race. This kind of short-term stress is normal and even useful. It can help you if you need to work hard or react quickly. For example, stress can help you finish an important job on time.  Long-term stress is caused by ongoing stressful situations or events. Examples of long-term stress include long-term health problems, ongoing problems at work, or conflicts in your family. Long-term stress can harm your health.  How does stress affect your health?  When you are stressed, your body responds as though you are in danger. It makes hormones that speed up your heart, make you breathe faster, and give you a burst of energy. This is called the fight-or-flight stress response. If the stress is over quickly, your body goes back to normal and no harm is done.  But if stress happens too often or lasts too long, it can have bad effects. Long-term stress can make you more likely to get sick, and it can make symptoms of some diseases worse. If you tense up when you are stressed, you may develop neck, shoulder, or low back pain. Stress is linked to high blood pressure and heart disease.  Stress also  harms your emotional health. It can make you oleary, tense, or depressed. Your relationships may suffer, and you may not do well at work or school.  What can you do to manage stress?  You can try these things to help manage stress:   Do something active. Exercise or activity can help reduce stress. Walking is a great way to get started. Even everyday activities such as housecleaning or yard work can help.  Try yoga or jennifer chi. These techniques combine exercise and meditation. You may need some training at first to learn them.  Do something you enjoy. For example, listen to music or go to a movie. Practice your hobby or do volunteer work.  Meditate. This can help you relax, because you are not worrying about what happened before or what may happen in the future.  Do guided imagery. Imagine yourself in any setting that helps you feel calm. You can use online videos, books, or a teacher to guide you.  Do breathing exercises. For example:  From a standing position, bend forward from the waist with your knees slightly bent. Let your arms dangle close to the floor.  Breathe in slowly and deeply as you return to a standing position. Roll up slowly and lift your head last.  Hold your breath for just a few seconds in the standing position.  Breathe out slowly and bend forward from the waist.  Let your feelings out. Talk, laugh, cry, and express anger when you need to. Talking with supportive friends or family, a counselor, or a karina leader about your feelings is a healthy way to relieve stress. Avoid discussing your feelings with people who make you feel worse.  Write. It may help to write about things that are bothering you. This helps you find out how much stress you feel and what is causing it. When you know this, you can find better ways to cope.  What can you do to prevent stress?  You might try some of these things to help prevent stress:  Manage your time. This helps you find time to do the things you want and need to  "do.  Get enough sleep. Your body recovers from the stresses of the day while you are sleeping.  Get support. Your family, friends, and community can make a difference in how you experience stress.  Limit your news feed. Avoid or limit time on social media or news that may make you feel stressed.  Do something active. Exercise or activity can help reduce stress. Walking is a great way to get started.  Where can you learn more?  Go to https://www.[employer].net/patiented  Enter N032 in the search box to learn more about \"Learning About Stress.\"  Current as of: October 24, 2024  Content Version: 14.4    8524-0562 "[employer].   Care instructions adapted under license by your healthcare professional. If you have questions about a medical condition or this instruction, always ask your healthcare professional. "[employer] disclaims any warranty or liability for your use of this information.       "

## 2025-04-14 NOTE — RESULT ENCOUNTER NOTE
Enrike,     Your cholesterol levels increased. Please make sure that you are taking your simvastatin every day.   You should plan a follow up in 6 months.   Carisa Mobley PA-C

## 2025-05-06 ENCOUNTER — ALLIED HEALTH/NURSE VISIT (OUTPATIENT)
Dept: FAMILY MEDICINE | Facility: CLINIC | Age: 60
End: 2025-05-06
Payer: COMMERCIAL

## 2025-05-06 VITALS — DIASTOLIC BLOOD PRESSURE: 69 MMHG | SYSTOLIC BLOOD PRESSURE: 115 MMHG | HEART RATE: 76 BPM

## 2025-05-06 DIAGNOSIS — I10 ESSENTIAL HYPERTENSION: Primary | ICD-10-CM

## 2025-05-06 PROCEDURE — 99207 PR NO CHARGE NURSE ONLY: CPT

## 2025-05-06 NOTE — PROGRESS NOTES
Enrike Daniel is a 59 year old patient who comes in today for a Blood Pressure check.  Initial BP:  /69 (BP Location: Left arm, Patient Position: Sitting, Cuff Size: Adult Large)   Pulse 76      76  Disposition: follow-up as previously indicated by provider and results routed to provider  Shahrzad Briones CMA

## 2025-05-12 ENCOUNTER — MYC MEDICAL ADVICE (OUTPATIENT)
Dept: FAMILY MEDICINE | Facility: CLINIC | Age: 60
End: 2025-05-12
Payer: COMMERCIAL

## 2025-07-12 ENCOUNTER — LAB (OUTPATIENT)
Dept: LAB | Facility: CLINIC | Age: 60
End: 2025-07-12

## 2025-07-12 DIAGNOSIS — E11.9 CONTROLLED TYPE 2 DIABETES MELLITUS WITHOUT COMPLICATION, WITHOUT LONG-TERM CURRENT USE OF INSULIN (H): ICD-10-CM

## 2025-07-12 LAB
CHOLEST SERPL-MCNC: 135 MG/DL
EST. AVERAGE GLUCOSE BLD GHB EST-MCNC: 143 MG/DL
FASTING STATUS PATIENT QL REPORTED: YES
HBA1C MFR BLD: 6.6 % (ref 0–5.6)
HDLC SERPL-MCNC: 33 MG/DL
LDLC SERPL CALC-MCNC: 75 MG/DL
NONHDLC SERPL-MCNC: 102 MG/DL
TRIGL SERPL-MCNC: 136 MG/DL

## 2025-07-12 PROCEDURE — 36415 COLL VENOUS BLD VENIPUNCTURE: CPT

## 2025-07-12 PROCEDURE — 80061 LIPID PANEL: CPT

## 2025-07-12 PROCEDURE — 83036 HEMOGLOBIN GLYCOSYLATED A1C: CPT
